# Patient Record
Sex: FEMALE | Race: WHITE | NOT HISPANIC OR LATINO | Employment: UNEMPLOYED | URBAN - METROPOLITAN AREA
[De-identification: names, ages, dates, MRNs, and addresses within clinical notes are randomized per-mention and may not be internally consistent; named-entity substitution may affect disease eponyms.]

---

## 2024-10-19 ENCOUNTER — HOSPITAL ENCOUNTER (EMERGENCY)
Facility: HOSPITAL | Age: 29
Discharge: HOME | End: 2024-10-19
Attending: STUDENT IN AN ORGANIZED HEALTH CARE EDUCATION/TRAINING PROGRAM

## 2024-10-19 VITALS
SYSTOLIC BLOOD PRESSURE: 110 MMHG | OXYGEN SATURATION: 98 % | BODY MASS INDEX: 28.4 KG/M2 | DIASTOLIC BLOOD PRESSURE: 78 MMHG | HEIGHT: 68 IN | TEMPERATURE: 99 F | HEART RATE: 95 BPM | WEIGHT: 187.38 LBS | RESPIRATION RATE: 16 BRPM

## 2024-10-19 DIAGNOSIS — O21.0 HYPEREMESIS GRAVIDARUM: Primary | ICD-10-CM

## 2024-10-19 DIAGNOSIS — Z36.9 1ST TRIMESTER SCREENING: ICD-10-CM

## 2024-10-19 LAB
B-HCG UR QL: POSITIVE
BUN SERPL-MCNC: 7 MG/DL (ref 6–30)
CHLORIDE SERPL-SCNC: 103 MMOL/L (ref 95–110)
CREAT SERPL-MCNC: 0.6 MG/DL (ref 0.5–1.4)
CTP QC/QA: YES
GLUCOSE SERPL-MCNC: 87 MG/DL (ref 70–110)
HCG INTACT+B SERPL-ACNC: NORMAL MIU/ML
HCT VFR BLD CALC: 42 %PCV (ref 36–54)
POC IONIZED CALCIUM: 1.21 MMOL/L (ref 1.06–1.42)
POC TCO2 (MEASURED): 21 MMOL/L (ref 23–29)
POTASSIUM BLD-SCNC: 3.8 MMOL/L (ref 3.5–5.1)
SAMPLE: ABNORMAL
SODIUM BLD-SCNC: 137 MMOL/L (ref 136–145)

## 2024-10-19 PROCEDURE — 99283 EMERGENCY DEPT VISIT LOW MDM: CPT

## 2024-10-19 PROCEDURE — 81025 URINE PREGNANCY TEST: CPT | Performed by: PHYSICIAN ASSISTANT

## 2024-10-19 PROCEDURE — 84702 CHORIONIC GONADOTROPIN TEST: CPT | Performed by: PHYSICIAN ASSISTANT

## 2024-10-19 RX ORDER — PYRIDOXINE HCL (VITAMIN B6) 25 MG
25 TABLET ORAL
Status: DISCONTINUED | OUTPATIENT
Start: 2024-10-19 | End: 2024-10-19 | Stop reason: HOSPADM

## 2024-10-19 RX ORDER — DOXYLAMINE SUCCINATE AND PYRIDOXINE HYDROCHLORIDE, DELAYED RELEASE TABLETS 10 MG/10 MG 10; 10 MG/1; MG/1
2 TABLET, DELAYED RELEASE ORAL NIGHTLY
Qty: 30 TABLET | Refills: 0 | Status: SHIPPED | OUTPATIENT
Start: 2024-10-19

## 2024-10-19 NOTE — ED NOTES
I-STAT Chem-8+ Results:   Value Reference Range   Sodium 137 136-145 mmol/L   Potassium  3.8 3.5-5.1 mmol/L   Chloride 103  mmol/L   Ionized Calcium 1.21 1.06-1.42 mmol/L   CO2 (measured) 21 23-29 mmol/L   Glucose 87  mg/dL   BUN 7 6-30 mg/dL   Creatinine 0.6 0.5-1.4 mg/dL   Hematocrit 42 36-54%

## 2024-10-19 NOTE — Clinical Note
"Penelope Pakdamien Velez was seen and treated in our emergency department on 10/19/2024.  She may return to work on 10/21/2024.       If you have any questions or concerns, please don't hesitate to call.      Brooklyn Reynolds PA-C"

## 2024-10-19 NOTE — DISCHARGE INSTRUCTIONS
You have an intrauterine pregnancy on bedside US. Establish care with OBGYN.   Please start over-the-counter prenatal vitamins.  If diclegis is too expensive, you can take over the counter Unisom and Vitamin B6 that has the same ingredients.   Take diclegis to help with nausea and vomiting.   Doxylamine 10 mg/pyridoxine 10 mg extended release (Diclegis): Oral: Initial: Two tablets at bedtime on days 1 and 2; if symptoms persist, take 1 tablet in morning and 2 tablets at bedtime on day 3; if symptoms persist, may further increase to 1 tablet in morning, 1 tablet mid-afternoon, and 2 tablets at bedtime on day 4 (maximum: doxylamine 40 mg/pyridoxine 40 mg [4 tablets] per day).   Follow up with OBGYN to establish care. Referral placed.  Return to the ER for new or worsening symptoms.

## 2024-10-19 NOTE — ED NOTES
"Penelope Velez, an 29 y.o. female presents to the ED via POV for complaints of vomiting 10x per day for the last 2 weeks. Denies blood in the vomit. Denies abdominal pain, CP, SOB, dizziness. Last episode of emesis was 1 hour PTA. States that her LKMP was >1 month ago. States she just returned back from [facility] and had "all the Sx that her cycle was going to start but it didn't".       LOC: The patient is awake, alert and aware of environment with an appropriate affect, the patient is oriented x 3 and speaking appropriately.   APPEARANCE: Patient appears comfortable and in no acute distress, patient is clean and well groomed.  SKIN: The skin is warm and dry, color consistent with ethnicity.   MUSCULOSKELETAL: Patient moving all extremities spontaneously, no swelling noted.  RESPIRATORY: Airway is open and patent, respirations are spontaneous, patient has a normal effort and rate, no accessory muscle use noted.  CARDIAC: Patient has a normal rate and regular rhythm, no edema noted, capillary refill < 3 seconds.   GASTRO: Soft and non tender to palpation, no distention noted. +vomiting  : Pt denies any pain or frequency with urination.  NEURO: Pt opens eyes spontaneously, behavior appropriate to situation, follows commands.        Chief Complaint   Patient presents with    Vomiting     Review of patient's allergies indicates:  No Known Allergies  No past medical history on file.    "

## 2024-10-21 NOTE — ED PROVIDER NOTES
Encounter Date: 10/19/2024       History     Chief Complaint   Patient presents with    Vomiting     Penelope Velez, an 29 y.o. female with no significant past medical history who presents to the ED with her  via POV for complaints of vomiting 10x per day for the last 2 weeks.     She reports symptoms all day. Denies any hematemesis, abd pain, CP, SOB, dizziness. Vomited 1 hour PTA. She states that she had 3 days of menstrual bleeding about 2 weeks ago.           Review of patient's allergies indicates:  No Known Allergies  History reviewed. No pertinent past medical history.  History reviewed. No pertinent surgical history.  No family history on file.  Social History     Tobacco Use    Smoking status: Never    Smokeless tobacco: Never   Substance Use Topics    Alcohol use: Not Currently    Drug use: Never     Review of Systems   Constitutional:  Negative for fever.   HENT:  Negative for sore throat.    Respiratory:  Negative for shortness of breath.    Cardiovascular:  Negative for chest pain.   Gastrointestinal:  Positive for nausea and vomiting. Negative for abdominal pain, blood in stool, constipation and diarrhea.   Genitourinary:  Negative for dysuria, frequency, hematuria, pelvic pain, vaginal bleeding and vaginal discharge.   Musculoskeletal:  Negative for back pain.   Skin:  Negative for rash.   Neurological:  Negative for weakness.   Hematological:  Does not bruise/bleed easily.       Physical Exam     Initial Vitals [10/19/24 1026]   BP Pulse Resp Temp SpO2   (!) 135/97 104 18 98.4 °F (36.9 °C) 98 %      MAP       --         Physical Exam    Vitals reviewed.  Constitutional: She appears well-developed and well-nourished. She is not diaphoretic. She is cooperative.  Non-toxic appearance. She does not have a sickly appearance. She does not appear ill. No distress.   Emesis bag in hand.   HENT:   Head: Normocephalic and atraumatic.   Nose: Nose normal. Mouth/Throat: No trismus in the jaw.   Eyes:  Conjunctivae and EOM are normal.   Neck:   Normal range of motion.  Pulmonary/Chest: No accessory muscle usage. No tachypnea. No respiratory distress.   Abdominal: Abdomen is soft. She exhibits no distension. There is no abdominal tenderness. There is no rebound and no guarding.   Musculoskeletal:         General: Normal range of motion.      Cervical back: Normal range of motion.     Neurological: She is alert. She has normal strength.   Skin: Skin is warm and dry. No erythema. No pallor.         ED Course   Procedures  Labs Reviewed   POCT URINE PREGNANCY - Abnormal       Result Value    POC Preg Test, Ur Positive (*)      Acceptable Yes     ISTAT PROCEDURE - Abnormal    POC Glucose 87      POC BUN 7      POC Creatinine 0.6      POC Sodium 137      POC Potassium 3.8      POC Chloride 103      POC TCO2 (MEASURED) 21 (*)     POC Ionized Calcium 1.21      POC Hematocrit 42      Sample MEMO     HCG, QUANTITATIVE    HCG Quant [id number]      Narrative:     Release to patient->Immediate          Imaging Results    None          Medications - No data to display  Medical Decision Making  Patient is pregnant which likely explains her symptoms for 10 days. She did have vaginal bleeding 2 weeks ago that resolved. No abdominal pain. No active vaginal bleeding or discharge. Attendign performed bedside US that confirms IUD. We can check istatchem8 and hcg and reassess.    Amount and/or Complexity of Data Reviewed  Labs: ordered. Decision-making details documented in ED Course.    Risk  Prescription drug management.               ED Course as of 10/20/24 2009   Sat Oct 19, 2024   1052 BP(!): 135/97 [CL]   1052 Temp: 98.4 °F (36.9 °C) [CL]   1052 Pulse: 104 [CL]   1052 Resp: 18 [CL]   1052 SpO2: 98 % [CL]   1052 hCG Qualitative, Urine(!): Positive [CL]      ED Course User Index  [CL] Brooklyn Reynolds PA-C          Istatchem wnl.   She has been tolerating water while here.   Rx diclegis. Made aware of OTC options. Consider  sunil candy, sodas, and distraction techniques. Start prenatal vitamins.   Return to ER precautions given. Referral to OB.            I have reviewed patient's chart and discussed this case with my supervising MD.     Brooklyn Reynolds PA-C  Emergent Department  [facility]  Spectralink #[id number] or #[id number]        Clinical Impression:  Final diagnoses:  [O21.0] Hyperemesis gravidarum (Primary)  [Z36.9] 1St trimester screening          ED Disposition Condition    Discharge Stable          ED Prescriptions       Medication Sig Dispense Start Date End Date Auth. Provider    doxylamine-pyridoxine, vit B6, (DICLEGIS) 10-10 mg TbEC Take 2 tablets by mouth every evening. 30 tablet 10/19/2024 -- Brooklyn Reynolds PA-C          Follow-up Information       Follow up With Specialties Details Why Contact Info Additional Information    [facility] Schedule an appointment as soon as possible for a visit   [address] [address] [phone] Turn at Entrance 1 on [facility] in [facility] and take elevators to Floor 2. Follow signs to [facility]. Take Center Elevators to Floor 4 for [address].    [facility] Emergency Dept Emergency Medicine  If symptoms worsen [address]  [facility] [address] [phone]                Brooklyn Reynolds PA-C  10/20/24 2009

## 2024-11-19 ENCOUNTER — HOSPITAL ENCOUNTER (EMERGENCY)
Facility: OTHER | Age: 29
Discharge: HOME | End: 2024-11-19
Attending: EMERGENCY MEDICINE
Payer: OTHER GOVERNMENT

## 2024-11-19 VITALS
OXYGEN SATURATION: 97 % | TEMPERATURE: 98 F | HEIGHT: 67 IN | DIASTOLIC BLOOD PRESSURE: 76 MMHG | SYSTOLIC BLOOD PRESSURE: 116 MMHG | WEIGHT: 196.19 LBS | HEART RATE: 82 BPM | RESPIRATION RATE: 18 BRPM | BODY MASS INDEX: 30.79 KG/M2

## 2024-11-19 DIAGNOSIS — R10.11 RUQ PAIN: ICD-10-CM

## 2024-11-19 DIAGNOSIS — R11.2 NAUSEA AND VOMITING, UNSPECIFIED VOMITING TYPE: Primary | ICD-10-CM

## 2024-11-19 LAB
ALBUMIN SERPL BCP-MCNC: 3.7 G/DL (ref 3.5–5.2)
ALP SERPL-CCNC: 53 U/L (ref 40–150)
ALT SERPL W/O P-5'-P-CCNC: 10 U/L (ref 10–44)
ANION GAP SERPL CALC-SCNC: 9 MMOL/L (ref 8–16)
AST SERPL-CCNC: 13 U/L (ref 10–40)
BACTERIA #/AREA URNS HPF: ABNORMAL /HPF
BASOPHILS # BLD AUTO: 0.02 K/UL (ref 0–0.2)
BASOPHILS NFR BLD: 0.2 % (ref 0–1.9)
BILIRUB SERPL-MCNC: 0.2 MG/DL (ref 0.1–1)
BILIRUB UR QL STRIP: NEGATIVE
BUN SERPL-MCNC: 5 MG/DL (ref 6–20)
CALCIUM SERPL-MCNC: 9.7 MG/DL (ref 8.7–10.5)
CHLORIDE SERPL-SCNC: 106 MMOL/L (ref 95–110)
CLARITY UR: ABNORMAL
CO2 SERPL-SCNC: 22 MMOL/L (ref 23–29)
COLOR UR: YELLOW
CREAT SERPL-MCNC: 0.7 MG/DL (ref 0.5–1.4)
DIFFERENTIAL METHOD BLD: NORMAL
EOSINOPHIL # BLD AUTO: 0.1 K/UL (ref 0–0.5)
EOSINOPHIL NFR BLD: 0.9 % (ref 0–8)
ERYTHROCYTE [DISTWIDTH] IN BLOOD BY AUTOMATED COUNT: 12.3 % (ref 11.5–14.5)
EST. GFR  (NO RACE VARIABLE): >60 ML/MIN/1.73 M^2
GLUCOSE SERPL-MCNC: 94 MG/DL (ref 70–110)
GLUCOSE UR QL STRIP: NEGATIVE
HCG INTACT+B SERPL-ACNC: NORMAL MIU/ML
HCT VFR BLD AUTO: 41.6 % (ref 37–48.5)
HGB BLD-MCNC: 14.2 G/DL (ref 12–16)
HGB UR QL STRIP: NEGATIVE
IMM GRANULOCYTES # BLD AUTO: 0.03 K/UL (ref 0–0.04)
IMM GRANULOCYTES NFR BLD AUTO: 0.3 % (ref 0–0.5)
KETONES UR QL STRIP: NEGATIVE
LEUKOCYTE ESTERASE UR QL STRIP: ABNORMAL
LIPASE SERPL-CCNC: 35 U/L (ref 4–60)
LYMPHOCYTES # BLD AUTO: 1.8 K/UL (ref 1–4.8)
LYMPHOCYTES NFR BLD: 19.9 % (ref 18–48)
MCH RBC QN AUTO: 29.8 PG (ref 27–31)
MCHC RBC AUTO-ENTMCNC: 34.1 G/DL (ref 32–36)
MCV RBC AUTO: 87 FL (ref 82–98)
MICROSCOPIC COMMENT: ABNORMAL
MONOCYTES # BLD AUTO: 0.5 K/UL (ref 0.3–1)
MONOCYTES NFR BLD: 6 % (ref 4–15)
NEUTROPHILS # BLD AUTO: 6.5 K/UL (ref 1.8–7.7)
NEUTROPHILS NFR BLD: 72.7 % (ref 38–73)
NITRITE UR QL STRIP: NEGATIVE
NRBC BLD-RTO: 0 /100 WBC
PH UR STRIP: 6 [PH] (ref 5–8)
PLATELET # BLD AUTO: 209 K/UL (ref 150–450)
PMV BLD AUTO: 10.5 FL (ref 9.2–12.9)
POTASSIUM SERPL-SCNC: 3.8 MMOL/L (ref 3.5–5.1)
PROT SERPL-MCNC: 7.3 G/DL (ref 6–8.4)
PROT UR QL STRIP: NEGATIVE
RBC # BLD AUTO: 4.76 M/UL (ref 4–5.4)
RBC #/AREA URNS HPF: 3 /HPF (ref 0–4)
SODIUM SERPL-SCNC: 137 MMOL/L (ref 136–145)
SP GR UR STRIP: 1.01 (ref 1–1.03)
SQUAMOUS #/AREA URNS HPF: 5 /HPF
URN SPEC COLLECT METH UR: ABNORMAL
UROBILINOGEN UR STRIP-ACNC: NEGATIVE EU/DL
WBC # BLD AUTO: 8.88 K/UL (ref 3.9–12.7)
WBC #/AREA URNS HPF: 14 /HPF (ref 0–5)

## 2024-11-19 PROCEDURE — 99285 EMERGENCY DEPT VISIT HI MDM: CPT | Mod: 25

## 2024-11-19 PROCEDURE — 80053 COMPREHEN METABOLIC PANEL: CPT | Performed by: PHYSICIAN ASSISTANT

## 2024-11-19 PROCEDURE — 84702 CHORIONIC GONADOTROPIN TEST: CPT | Performed by: PHYSICIAN ASSISTANT

## 2024-11-19 PROCEDURE — 81000 URINALYSIS NONAUTO W/SCOPE: CPT | Performed by: PHYSICIAN ASSISTANT

## 2024-11-19 PROCEDURE — 96374 THER/PROPH/DIAG INJ IV PUSH: CPT

## 2024-11-19 PROCEDURE — 83690 ASSAY OF LIPASE: CPT | Performed by: PHYSICIAN ASSISTANT

## 2024-11-19 PROCEDURE — 85025 COMPLETE CBC W/AUTO DIFF WBC: CPT | Performed by: PHYSICIAN ASSISTANT

## 2024-11-19 PROCEDURE — 63600175 PHARM REV CODE 636 W HCPCS: Performed by: PHYSICIAN ASSISTANT

## 2024-11-19 PROCEDURE — 87086 URINE CULTURE/COLONY COUNT: CPT | Performed by: PHYSICIAN ASSISTANT

## 2024-11-19 RX ORDER — ONDANSETRON HYDROCHLORIDE 2 MG/ML
4 INJECTION, SOLUTION INTRAVENOUS
Status: COMPLETED | OUTPATIENT
Start: 2024-11-19 | End: 2024-11-19

## 2024-11-19 RX ORDER — ONDANSETRON 4 MG/1
4 TABLET, FILM COATED ORAL EVERY 6 HOURS
Qty: 30 TABLET | Refills: 1 | Status: SHIPPED | OUTPATIENT
Start: 2024-11-19

## 2024-11-19 RX ORDER — NITROFURANTOIN 25; 75 MG/1; MG/1
100 CAPSULE ORAL 2 TIMES DAILY
Qty: 10 CAPSULE | Refills: 0 | Status: SHIPPED | OUTPATIENT
Start: 2024-11-19 | End: 2024-11-24

## 2024-11-19 RX ADMIN — ONDANSETRON 4 MG: 2 INJECTION INTRAMUSCULAR; INTRAVENOUS at 12:11

## 2024-11-19 NOTE — ED PROVIDER NOTES
Encounter Date: 2024       History     Chief Complaint   Patient presents with    Emesis     Pt reports intermittent vomiting and abdominal cramping x 1 month. Pt states she is ~11 wks pregnant     Patient was a 29-year-old female with no past medical history,  that presents to emergency department with nausea and vomiting.  Patient recently diagnosed at outside hospital after she presented with abdominal pain and nausea and vomiting.  Patient had positive pregnancy test and bedside ultrasound with intrauterine pregnancy performed by ER physician.  There was no radiology performed ultrasound at that time.  Patient states that since discharge she has been taking B6 and Unisom without any improvement in her nausea and vomiting.  Patient unable to tolerate any oral intake besides small amount of liquids and crackers.  She has been able to tolerate her vitamins every morning.  Denies any vaginal discharge or pelvic pain.     The history is provided by the patient.     Review of patient's allergies indicates:  No Known Allergies  History reviewed. No pertinent past medical history.  History reviewed. No pertinent surgical history.  No family history on file.  Social History     Tobacco Use    Smoking status: Never    Smokeless tobacco: Never   Substance Use Topics    Alcohol use: Not Currently    Drug use: Never     Review of Systems  ROS negative except as noted in HPI    Physical Exam     Initial Vitals   BP Pulse Resp Temp SpO2   24 1117 24 1117 24 1117 24 1347 24 1117   (!) 141/87 93 20 98 °F (36.7 °C) 99 %      MAP       --                Physical Exam    Nursing note and vitals reviewed.  Constitutional: She appears well-developed and well-nourished. She is not diaphoretic. She appears distressed.   HENT:   Head: Normocephalic and atraumatic.   Right Ear: External ear normal.   Left Ear: External ear normal.   Nose: Nose normal. Mouth/Throat: Oropharynx is clear and moist.    Cardiovascular:  Normal rate, regular rhythm, normal heart sounds and intact distal pulses.           Pulmonary/Chest: Breath sounds normal. No respiratory distress. She has no wheezes. She has no rales.   Abdominal: Abdomen is soft. She exhibits no distension. There is abdominal tenderness (Right upper quadrant tenderness to palpation). There is no rebound.   Musculoskeletal:         General: No edema.     Neurological: She is alert and oriented to person, place, and time. She has normal strength.   Skin: Skin is warm. Capillary refill takes less than 2 seconds.   Psychiatric: She has a normal mood and affect.         ED Course   Procedures  Labs Reviewed   COMPREHENSIVE METABOLIC PANEL - Abnormal       Result Value    Sodium 137      Potassium 3.8      Chloride 106      CO2 22 (*)     Glucose 94      BUN 5 (*)     Creatinine 0.7      Calcium 9.7      Total Protein 7.3      Albumin 3.7      Total Bilirubin 0.2      Alkaline Phosphatase 53      AST 13      ALT 10      eGFR >60      Anion Gap 9      Narrative:     Release to patient->Immediate   URINALYSIS, REFLEX TO URINE CULTURE - Abnormal    Specimen UA Urine, Clean Catch      Color, UA Yellow      Appearance, UA Hazy (*)     pH, UA 6.0      Specific Gravity, UA 1.010      Protein, UA Negative      Glucose, UA Negative      Ketones, UA Negative      Bilirubin (UA) Negative      Occult Blood UA Negative      Nitrite, UA Negative      Urobilinogen, UA Negative      Leukocytes, UA 2+ (*)     Narrative:     Specimen Source->Urine   URINALYSIS MICROSCOPIC - Abnormal    RBC, UA 3      WBC, UA 14 (*)     Bacteria Many (*)     Squam Epithel, UA 5      Microscopic Comment SEE COMMENT      Narrative:     Specimen Source->Urine   CULTURE, URINE   CBC W/ AUTO DIFFERENTIAL    WBC 8.88      RBC 4.76      Hemoglobin 14.2      Hematocrit 41.6      MCV 87      MCH 29.8      MCHC 34.1      RDW 12.3      Platelets 209      MPV 10.5      Immature Granulocytes 0.3      Gran # (ANC)  6.5      Immature Grans (Abs) 0.03      Lymph # 1.8      Mono # 0.5      Eos # 0.1      Baso # 0.02      nRBC 0      Gran % 72.7      Lymph % 19.9      Mono % 6.0      Eosinophil % 0.9      Basophil % 0.2      Differential Method Automated      Narrative:     Release to patient->Immediate   HCG, QUANTITATIVE    HCG Quant [id number]      Narrative:     Release to patient->Immediate   LIPASE   LIPASE    Lipase 35      Narrative:     Release to patient->Immediate          Imaging Results              US OB <14 Wks, TransAbd, Single Gestation (Final result)  Result time 11/19/24 13:25:43      Final result by Juanjo Gonzalez III, MD (11/19/24 13:25:43)                   Impression:      Pregnancy normal 11 weeks 5 days.  Delivery date is 06/05/2025.      Electronically signed by: Juanjo Gonzalez MD  Date:    11/19/2024  Time:    13:25               Narrative:    EXAMINATION:  US OB <14 WEEKS TRANSABDOM, SINGLE GESTATION    CLINICAL HISTORY:  Other specified pregnancy related conditions, unspecified trimester    FINDINGS:  Uterus measures 13.2 x 6.3 x 10 cm.    Right ovary measures 3.3 x 2.6 x 1.3 cm.    Left ovary measures 2.8 x 1.6 x 1 6 cm.    There is a right corpus luteum cyst of pregnancy measuring 1.4 cm.  There is a crown-rump length corresponding to 11 weeks 5 days.  Heart rate is 167 beats per minute.  There is a yolk sac.  Delivery date is 06/05/2025.                                       US Abdomen Limited (Final result)  Result time 11/19/24 13:24:32   Procedure changed from US Abdomen Complete     Final result by Juanjo Gonzalez III, MD (11/19/24 13:24:32)                   Impression:      No acute process seen.      Electronically signed by: Juanjo Gonzalez MD  Date:    11/19/2024  Time:    13:24               Narrative:    EXAMINATION:  US ABDOMEN LIMITED    CLINICAL HISTORY:  RUQ pain;  Right upper quadrant pain    FINDINGS:  The pancreas demonstrates no abnormality.  The gallbladder demonstrates no stone,  wall thickening, or Pearl sign and the bile duct measures 2 mm.  No intrahepatic bile duct dilatation seen.  There is no hypervascularity.  The bile duct is 3 mm.                                       Medications   ondansetron injection 4 mg (4 mg Intravenous Given 24 1220)     Medical Decision Making  Patient was a 29-year-old female with no past medical history,  that presents to emergency department with nausea and vomiting.     On initial evaluation patient was in mild distress secondary to vomiting.  She was vomited multiple times and triage.  Vitals are within normal limits and patient was cooperative with the history and physical examination.    Differential for patient's presentation includes hyperemesis gravidarum, cholelithiasis, UTI, pyelonephritis.  Patient did have right upper quadrant tenderness.  Patient was not been trialed on any other medications except for B6 and Unisom.  We will provide adjunctive supportive treatment in the ER to improve symptoms.  Workup initiated evaluate for reversible causes of abdominal tenderness and nausea and vomiting.    Symptoms resolved after supportive treatment in the ER.  Patient has asymptomatic bacteria in pregnancy.  Additionally we will provide patient with Macrobid prescription and prescription for Zofran to take at home.  She expressed understanding had no other concerns or questions at this time.  ER return precautions given.    Amount and/or Complexity of Data Reviewed  Labs:  Decision-making details documented in ED Course.  Radiology: ordered.    Risk  Prescription drug management.               ED Course as of 24 1550   Tue 2024   1242 Bacteria, UA(!): Many [TK]   1242 WBC, UA(!): 14 [TK]   1242 Leukocyte Esterase, UA(!): 2+ [TK]   1248 Lipase: 35 [TK]   1248 Sodium: 137 [TK]   1248 Potassium: 3.8 [TK]   1248 Creatinine: 0.7 [TK]   1248 BUN(!): 5 [TK]   1248 BILIRUBIN TOTAL: 0.2 [TK]   1248 ALP: 53 [TK]   1248 AST: 13 [TK]   1248  ALT: 10 [TK]   1334 Beta HCG Quant: [id number]  Increased compared to previous level   [TK]      ED Course User Index  [TK] Veda Keenan DO                             Clinical Impression:  Final diagnoses:  [R10.11] RUQ pain - Positive anthony  [R11.2] Nausea and vomiting, unspecified vomiting type (Primary)          ED Disposition Condition    Discharge Stable          ED Prescriptions       Medication Sig Dispense Start Date End Date Auth. Provider    ondansetron (ZOFRAN) 4 MG tablet Take 1 tablet (4 mg total) by mouth every 6 (six) hours. 30 tablet 11/19/2024 -- Veda Keenan DO    nitrofurantoin, macrocrystal-monohydrate, (MACROBID) 100 MG capsule Take 1 capsule (100 mg total) by mouth 2 (two) times daily. for 5 days 10 capsule 11/19/2024 11/24/2024 Veda Keenan DO          Follow-up Information       Follow up With Specialties Details Why Contact Info    [facility] - Emergency Dept Emergency Medicine  If symptoms worsen [address]  [facility] [id number] [phone]             Veda Keenan DO  Resident  11/19/24 3557

## 2024-11-19 NOTE — ED TRIAGE NOTES
Pt c/o hyperemesis gravidarum throughout pregnancy. Also c/o 2 days abdominal cramping without bleeding. States she is approx 11w pregnant.

## 2024-11-19 NOTE — FIRST PROVIDER EVALUATION
Emergency Department TeleTriage Encounter Note      CHIEF COMPLAINT    Chief Complaint   Patient presents with    Emesis     Pt reports intermittent vomiting and abdominal cramping x 1 month. Pt states she is ~11 wks pregnant       VITAL SIGNS   Initial Vitals [11/19/24 1117]   BP Pulse Resp Temp SpO2   (!) 141/87 93 20 -- 99 %      MAP       --            ALLERGIES    Review of patient's allergies indicates:  No Known Allergies    PROVIDER TRIAGE NOTE  Patient is 11 weeks pregnant presenting with persistent abdominal pain and nausea. She has not been able to be seen by OB yet because of insurance problems. No vaginal bleeding. OTC nausea meds not helping.       ORDERS  Labs Reviewed - No data to display    ED Orders (720h ago, onward)      None              Virtual Visit Note: The provider triage portion of this emergency department evaluation and documentation was performed via [employer], a HIPAA-compliant telemedicine application, in concert with a tele-presenter in the room. A face to face patient evaluation with one of my colleagues will occur once the patient is placed in an emergency department room.      DISCLAIMER: This note was prepared with [employer] voice recognition transcription software. Garbled syntax, mangled pronouns, and other bizarre constructions may be attributed to that software system.

## 2024-11-19 NOTE — DISCHARGE INSTRUCTIONS
We evaluated you for causes of nausea and vomiting. Suspect that this is related to your current pregnancy. In your work up we saw evidence of Urinary track infection.     Please fill Nitrofurantoin prescription and take till all pill are gone.     Additionally We placed prescription for Zofran, please take for nausea as needed.

## 2024-11-20 LAB — BACTERIA UR CULT: NO GROWTH

## 2024-11-29 ENCOUNTER — CLINICAL SUPPORT (OUTPATIENT)
Dept: OBSTETRICS AND GYNECOLOGY | Facility: CLINIC | Age: 29
End: 2024-11-29

## 2024-11-29 ENCOUNTER — PATIENT MESSAGE (OUTPATIENT)
Dept: OBSTETRICS AND GYNECOLOGY | Facility: CLINIC | Age: 29
End: 2024-11-29
Payer: OTHER GOVERNMENT

## 2024-11-29 DIAGNOSIS — N91.2 AMENORRHEA: Primary | ICD-10-CM

## 2024-11-29 PROCEDURE — 99999 PR PBB SHADOW E&M-EST. PATIENT-LVL II: CPT | Mod: PBBFAC,,,

## 2024-11-29 PROCEDURE — 99212 OFFICE O/P EST SF 10 MIN: CPT | Mod: PBBFAC

## 2024-12-10 ENCOUNTER — HOSPITAL ENCOUNTER (OUTPATIENT)
Dept: PERINATAL CARE | Facility: OTHER | Age: 29
Discharge: HOME | End: 2024-12-10
Attending: STUDENT IN AN ORGANIZED HEALTH CARE EDUCATION/TRAINING PROGRAM
Payer: OTHER GOVERNMENT

## 2024-12-10 ENCOUNTER — OFFICE VISIT (OUTPATIENT)
Dept: OBSTETRICS AND GYNECOLOGY | Facility: CLINIC | Age: 29
End: 2024-12-10
Payer: OTHER GOVERNMENT

## 2024-12-10 VITALS — BODY MASS INDEX: 31.63 KG/M2 | WEIGHT: 201.5 LBS | HEIGHT: 67 IN

## 2024-12-10 DIAGNOSIS — Z34.90 PREGNANCY, UNSPECIFIED GESTATIONAL AGE: Primary | ICD-10-CM

## 2024-12-10 DIAGNOSIS — N91.2 AMENORRHEA: ICD-10-CM

## 2024-12-10 DIAGNOSIS — O21.0 HYPEREMESIS GRAVIDARUM: ICD-10-CM

## 2024-12-10 PROCEDURE — 99213 OFFICE O/P EST LOW 20 MIN: CPT | Mod: PBBFAC,25 | Performed by: ADVANCED PRACTICE MIDWIFE

## 2024-12-10 PROCEDURE — 76815 OB US LIMITED FETUS(S): CPT

## 2024-12-10 PROCEDURE — 87086 URINE CULTURE/COLONY COUNT: CPT | Performed by: ADVANCED PRACTICE MIDWIFE

## 2024-12-10 PROCEDURE — 99999 PR PBB SHADOW E&M-EST. PATIENT-LVL III: CPT | Mod: PBBFAC,,, | Performed by: ADVANCED PRACTICE MIDWIFE

## 2024-12-10 PROCEDURE — 87491 CHLMYD TRACH DNA AMP PROBE: CPT | Performed by: ADVANCED PRACTICE MIDWIFE

## 2024-12-10 PROCEDURE — 76816 OB US FOLLOW-UP PER FETUS: CPT | Mod: 26,,, | Performed by: OBSTETRICS & GYNECOLOGY

## 2024-12-10 RX ORDER — ONDANSETRON 8 MG/1
8 TABLET, ORALLY DISINTEGRATING ORAL EVERY 6 HOURS PRN
Qty: 30 TABLET | Refills: 1 | Status: SHIPPED | OUTPATIENT
Start: 2024-12-10

## 2024-12-10 NOTE — PROGRESS NOTES
HISTORY OF PRESENT ILLNESS:    Penelope Velez is a 29 y.o. female, ,  No LMP recorded. Patient is pregnant.  for a routine exam complaining of amenorrhea.    Pt reports LMP of 24, had dating scan today- report pending.   Pt reports nausea.  Past Medical History:   Diagnosis Date    Chicken pox 7    Hepatitis B Baby    Was a baby. It was treated       History reviewed. No pertinent surgical history.    MEDICATIONS AND ALLERGIES:      Current Outpatient Medications:     doxylamine-pyridoxine, vit B6, (DICLEGIS) 10-10 mg TbEC, Take 2 tablets by mouth every evening., Disp: 30 tablet, Rfl: 0    ondansetron (ZOFRAN) 4 MG tablet, Take 1 tablet (4 mg total) by mouth every 6 (six) hours., Disp: 30 tablet, Rfl: 1    ondansetron (ZOFRAN-ODT) 8 MG TbDL, Take 1 tablet (8 mg total) by mouth every 6 (six) hours as needed (anusea)., Disp: 30 tablet, Rfl: 1    Review of patient's allergies indicates:  No Known Allergies    Family History   Problem Relation Name Age of Onset    Breast cancer Neg Hx      Uterine cancer Neg Hx      Cervical cancer Neg Hx         Social History     Socioeconomic History    Marital status:    Tobacco Use    Smoking status: Never    Smokeless tobacco: Never   Substance and Sexual Activity    Alcohol use: Never    Drug use: Never    Sexual activity: Yes     Partners: Male     Birth control/protection: None       COMPREHENSIVE GYN HISTORY:  PAP History: Denies abnormal Paps.  Infection History: Denies STDs. Denies PID. Hx hep B as a child  Benign History: Denies uterine fibroids. Denies ovarian cysts. Denies endometriosis. Denies other conditions.  Cancer History: Denies cervical cancer. Denies uterine cancer or hyperplasia. Denies ovarian cancer. Denies vulvar cancer or pre-cancer. Denies vaginal cancer or pre-cancer. Denies breast cancer. Denies colon cancer.  Sexual Activity History: Reports currently being sexually active  Menstrual History: None.  Contraception: None    ROS:  GENERAL: No  "weight changes. No swelling. No fatigue. No fever.  CARDIOVASCULAR: No chest pain. No shortness of breath. No leg cramps.   NEUROLOGICAL: No headaches. No vision changes.  BREASTS: No pain. No lumps. No discharge.  ABDOMEN: No pain. No nausea. No vomiting. No diarrhea. No constipation.  REPRODUCTIVE: No abnormal bleeding.   VULVA: No pain. No lesions. No itching.  VAGINA: No relaxation. No itching. No odor. No discharge. No lesions.  URINARY: No incontinence. No nocturia. No frequency. No dysuria.    Ht 5' 7" (1.702 m)   Wt 91.4 kg (201 lb 8 oz)   BMI 31.56 kg/m²     PE:  AFFECT: Alert and oriented X 3. Interactive during exam  GENERAL: Appearance well-nourished, well-developed, in no acute distress.  HEENT: WNL  TEETH: Good dentition.  THYROID: No thyromegally   LUNGS: Easy and unlabored  HEART: Regular rate and rhythm   ABDOMEN: Soft and nontender without masses or organomegally.  EXTREMITIES: No cyanosis, clubbing or edema.   SKIN: No lesions or rashes.  VULVA: No lesions, masses or tenderness.  VAGINA: Moist and well rugated without lesions or discharge.  CERVIX: Moist and pink without lesions, discharge or tenderness.    Spotting with Pap  UTERUS SIZE: nontender and without masses.  ADNEXA: No masses or tenderness.  RECTUM: Deferred.  ESTIMATE OF PELVIC CAPACITY: Adequate    PROCEDURES:  UPT Positive  Genprobe  Pap    DIAGNOSIS:  Gyn exam  IUP with stated LMP of 09/29/24, CHELSEA pending per dating scan report    PLAN:Routine prenatal care    MEDICATIONS PRESCRIBED:    LABS AND TESTS ORDERED:  New Ob Labs      NEW PREGNANCY COUNSELING  Patient was counseled today on:  - Routine prenatal blood tests including HIV and anticipated course of prenatal care  - Prenatal vitamins and folic acid  - Weight gain, nutrition, and exercise  - Seafood and mercury  - Properly heating deli and prepared meats and avoiding unrefrigerated deli  meats, cheeses, and milk products,   - Avoiding cat litter and raw meats due to risk of " Toxoplasmosis precautions   - Accuracy of the LMP-based CHELSEA and the value of an early TV-u/s  - Aneuploidy and neural tube screening -- cffDNA, sequential screening, and AFP screen at 15 weeks  - OTC medication in the first trimester  - Harmful effects of smoking, etOH, and recreational drugs  - [facility] u/s  at 18-20 weeks.  - Common complaints of pregnancy  - Seat belt use  - Childbirth classes and hospital facilities  - All questions were answered    TERATOLOGY COUNSELING:   Discussed indications and options for aneuploidy screening - pamphlets given    - Pain and bleeding precautions given    - Return to clinic in 4 weeks    Hayley Robledo CNM    OB/GYN    Total time of 30 minutes, including face-to-face time and non-face-to-face time preparing to see the patient (eg, review of tests), obtaining and/or reviewing separately obtained history, documenting clinical information in the electronic or other health record, independently interpreting results, communicating results to the patient/family/caregiver, or care coordination.

## 2024-12-12 LAB — BACTERIA UR CULT: NORMAL

## 2024-12-14 LAB
C TRACH DNA SPEC QL NAA+PROBE: NORMAL
N GONORRHOEA DNA SPEC QL NAA+PROBE: NORMAL

## 2025-01-06 ENCOUNTER — TELEPHONE (OUTPATIENT)
Dept: OBSTETRICS AND GYNECOLOGY | Facility: CLINIC | Age: 30
End: 2025-01-06

## 2025-01-06 DIAGNOSIS — Z36.89 ENCOUNTER FOR FETAL ANATOMIC SURVEY: Primary | ICD-10-CM

## 2025-01-06 NOTE — TELEPHONE ENCOUNTER
Spoke to pt's . They desire to have the materna 21 as it has been approved by their insurance company. Advised can have drawn at next visit. Pt's  agreed with plan

## 2025-01-15 ENCOUNTER — PROCEDURE VISIT (OUTPATIENT)
Dept: MATERNAL FETAL MEDICINE | Facility: CLINIC | Age: 30
End: 2025-01-15
Payer: OTHER GOVERNMENT

## 2025-01-15 DIAGNOSIS — Z36.2 ENCOUNTER FOR FOLLOW-UP ULTRASOUND OF FETAL ANATOMY: Primary | ICD-10-CM

## 2025-01-15 DIAGNOSIS — Z36.89 ENCOUNTER FOR FETAL ANATOMIC SURVEY: ICD-10-CM

## 2025-01-15 PROCEDURE — 76805 OB US >/= 14 WKS SNGL FETUS: CPT | Mod: PBBFAC | Performed by: OBSTETRICS & GYNECOLOGY

## 2025-01-17 ENCOUNTER — INITIAL PRENATAL (OUTPATIENT)
Dept: OBSTETRICS AND GYNECOLOGY | Facility: CLINIC | Age: 30
End: 2025-01-17
Payer: OTHER GOVERNMENT

## 2025-01-17 ENCOUNTER — LAB VISIT (OUTPATIENT)
Dept: LAB | Facility: OTHER | Age: 30
End: 2025-01-17
Attending: OBSTETRICS & GYNECOLOGY
Payer: OTHER GOVERNMENT

## 2025-01-17 VITALS
SYSTOLIC BLOOD PRESSURE: 123 MMHG | WEIGHT: 204.38 LBS | DIASTOLIC BLOOD PRESSURE: 89 MMHG | BODY MASS INDEX: 32.01 KG/M2

## 2025-01-17 DIAGNOSIS — Z34.00 SUPERVISION OF NORMAL FIRST PREGNANCY, ANTEPARTUM: ICD-10-CM

## 2025-01-17 DIAGNOSIS — Z34.00 SUPERVISION OF NORMAL FIRST PREGNANCY, ANTEPARTUM: Primary | ICD-10-CM

## 2025-01-17 PROCEDURE — 99212 OFFICE O/P EST SF 10 MIN: CPT | Mod: PBBFAC | Performed by: OBSTETRICS & GYNECOLOGY

## 2025-01-17 PROCEDURE — 99999 PR PBB SHADOW E&M-EST. PATIENT-LVL II: CPT | Mod: PBBFAC,,, | Performed by: OBSTETRICS & GYNECOLOGY

## 2025-01-17 PROCEDURE — 36415 COLL VENOUS BLD VENIPUNCTURE: CPT | Performed by: OBSTETRICS & GYNECOLOGY

## 2025-01-17 PROCEDURE — 87086 URINE CULTURE/COLONY COUNT: CPT | Performed by: OBSTETRICS & GYNECOLOGY

## 2025-01-17 PROCEDURE — 0502F SUBSEQUENT PRENATAL CARE: CPT | Mod: S$PBB,,, | Performed by: OBSTETRICS & GYNECOLOGY

## 2025-01-17 RX ORDER — POLYETHYLENE GLYCOL 3350 17 G/17G
POWDER, FOR SOLUTION ORAL
Qty: 595 G | Refills: 3 | Status: SHIPPED | OUTPATIENT
Start: 2025-01-17

## 2025-01-17 RX ORDER — PRENATAL WITH FERROUS FUM AND FOLIC ACID 3080; 920; 120; 400; 22; 1.84; 3; 20; 10; 1; 12; 200; 27; 25; 2 [IU]/1; [IU]/1; MG/1; [IU]/1; MG/1; MG/1; MG/1; MG/1; MG/1; MG/1; UG/1; MG/1; MG/1; MG/1; MG/1
1 TABLET ORAL DAILY
Qty: 30 TABLET | Refills: 11 | Status: SHIPPED | OUTPATIENT
Start: 2025-01-17 | End: 2026-01-17

## 2025-01-17 NOTE — PROGRESS NOTES
30yo  presents for inial OB visit at 20w1d.  Overall doing well today.  Has started feeling fetal flutter.  No vb/spotting.  She does struggle with constipation, no relief with suppository.  Also notices she pees on herself a lot, wakes up soaked sometimes.  Also sometimes incomplete empything.  Denies dysuria.  No vb/spotting.  No ctx/pain.  No other complaints today.  Taking PNV.      V-scan: Normal fluid noted around baby, cardiac activity and movement noted.     A/P:   Initial OB visit today.   Labs reviewed.  Mat 21 orders placed.   Anatomy US scheduled .   Discussed incontinence with patient.  Very reassuring fluid level around baby, do not suspect PPROM.  Recommend frequent bladder emptying.  Urine cx pending.   Recommend miralax prn constipation.  Encourage PO hydration.   Anatomy US scheduled .   OB ED precautions given.   Counseling done, precautions given, all questions answered.  RTC 4 wks for OB visit and gluocla.     Yesenia Orozco MD

## 2025-01-19 LAB
BACTERIA UR CULT: NORMAL
BACTERIA UR CULT: NORMAL

## 2025-01-23 LAB
ABOUT THE TEST: NORMAL
APPROVED BY: NORMAL
FETAL FRACTION: NORMAL
FETAL SEX RESULT: NORMAL
GESTATIONAL AGE > 9: YES
GESTATIONAL AGE: NORMAL
LAB DIRECTOR COMMENTS: NORMAL
LIMITATIONS:: NORMAL
MONOSOMY X RESULT: NOT DETECTED
NEGATIVE PREDICTIVE VALUE: NORMAL
NOTE: NORMAL
PERFORMANCE CHARACTERISTICS: NORMAL
PERFORMANCE: NORMAL
POSITIVE PREDICTIVE VALUE: NORMAL
RESULT: NEGATIVE
SERVICE CMNT 04-IMP: NORMAL
TEST METHODOLOGY:: NORMAL
TRISOMY 13 (T13): NEGATIVE
TRISOMY 18: NEGATIVE
TRISOMY 21 (T21): NEGATIVE
XXX (TRIPLE X SYNDROME): NOT DETECTED
XXY (KLINEFELTER SYNDROME): NOT DETECTED
XYY (JACOBS SYNDROME): NOT DETECTED

## 2025-01-28 DIAGNOSIS — O21.0 HYPEREMESIS GRAVIDARUM: ICD-10-CM

## 2025-01-28 RX ORDER — ONDANSETRON 8 MG/1
8 TABLET, ORALLY DISINTEGRATING ORAL EVERY 6 HOURS PRN
Qty: 30 TABLET | Refills: 1 | Status: SHIPPED | OUTPATIENT
Start: 2025-01-28

## 2025-02-12 ENCOUNTER — PROCEDURE VISIT (OUTPATIENT)
Dept: MATERNAL FETAL MEDICINE | Facility: CLINIC | Age: 30
End: 2025-02-12
Payer: OTHER GOVERNMENT

## 2025-02-12 DIAGNOSIS — Z36.2 ENCOUNTER FOR FOLLOW-UP ULTRASOUND OF FETAL ANATOMY: ICD-10-CM

## 2025-02-12 PROCEDURE — 76816 OB US FOLLOW-UP PER FETUS: CPT | Mod: PBBFAC | Performed by: OBSTETRICS & GYNECOLOGY

## 2025-02-13 ENCOUNTER — PATIENT MESSAGE (OUTPATIENT)
Dept: OTHER | Facility: OTHER | Age: 30
End: 2025-02-13
Payer: OTHER GOVERNMENT

## 2025-02-14 ENCOUNTER — PATIENT MESSAGE (OUTPATIENT)
Dept: OBSTETRICS AND GYNECOLOGY | Facility: CLINIC | Age: 30
End: 2025-02-14

## 2025-02-14 ENCOUNTER — LAB VISIT (OUTPATIENT)
Dept: LAB | Facility: OTHER | Age: 30
End: 2025-02-14
Attending: OBSTETRICS & GYNECOLOGY
Payer: OTHER GOVERNMENT

## 2025-02-14 ENCOUNTER — ROUTINE PRENATAL (OUTPATIENT)
Dept: OBSTETRICS AND GYNECOLOGY | Facility: CLINIC | Age: 30
End: 2025-02-14
Payer: OTHER GOVERNMENT

## 2025-02-14 VITALS
SYSTOLIC BLOOD PRESSURE: 117 MMHG | WEIGHT: 214.31 LBS | BODY MASS INDEX: 33.56 KG/M2 | DIASTOLIC BLOOD PRESSURE: 80 MMHG

## 2025-02-14 DIAGNOSIS — M54.32 LEFT SCIATIC NERVE PAIN: ICD-10-CM

## 2025-02-14 DIAGNOSIS — Z34.00 SUPERVISION OF NORMAL FIRST PREGNANCY, ANTEPARTUM: Primary | ICD-10-CM

## 2025-02-14 DIAGNOSIS — Z34.00 SUPERVISION OF NORMAL FIRST PREGNANCY, ANTEPARTUM: ICD-10-CM

## 2025-02-14 DIAGNOSIS — M25.552 LEFT HIP PAIN: ICD-10-CM

## 2025-02-14 LAB — GLUCOSE SERPL-MCNC: 84 MG/DL (ref 70–140)

## 2025-02-14 PROCEDURE — 99999 PR PBB SHADOW E&M-EST. PATIENT-LVL III: CPT | Mod: PBBFAC,,, | Performed by: OBSTETRICS & GYNECOLOGY

## 2025-02-14 PROCEDURE — 36415 COLL VENOUS BLD VENIPUNCTURE: CPT | Performed by: OBSTETRICS & GYNECOLOGY

## 2025-02-14 PROCEDURE — 82950 GLUCOSE TEST: CPT | Performed by: OBSTETRICS & GYNECOLOGY

## 2025-02-14 PROCEDURE — 99213 OFFICE O/P EST LOW 20 MIN: CPT | Mod: PBBFAC | Performed by: OBSTETRICS & GYNECOLOGY

## 2025-02-14 RX ORDER — DOCUSATE SODIUM 100 MG/1
100 CAPSULE, LIQUID FILLED ORAL 2 TIMES DAILY
Qty: 60 CAPSULE | Refills: 11 | Status: SHIPPED | OUTPATIENT
Start: 2025-02-14

## 2025-02-14 NOTE — PROGRESS NOTES
30yo  at 24w1d.   Overall doing well today.  Still with some constipation, but improving.  Has now developed hemorrhoids.  No bleeding/spotting.  No ctx/lof.  +FM.   Also reports left hip/sciatic nerve pain.  No other complaints today.  Taking PNV.      PE:     A/P:   Routine OB visit today.   Glucola pending today.   Recommend preparation H for hemorrhoids, avoid straining.  Will start on daily colace.   Continue miralax prn constipation.  Encourage PO hydration.   PT referral for hip/sciatic nerve pain.  Discussed stretches, and lifestyle modifications - hold on to something until pain subsides and can walk normally.   OB ED precautions given.   Counseling done, precautions given, all questions answered.  RTC 4 wks for OB visit.     Yesenia Orozco MD

## 2025-02-14 NOTE — LETTER
February 14, 2025    Penelope Velez  [address]  [address]  [facility] [address]             [facility]  [address]  [place] [address]  Phone: [phone]  Fax: [phone] To Whom It May Concern:     Ms. Penelope Velez is currently pregnant and under my obstetric care.  Her estimated date of delivery is June 5, 2025.  The father of the baby is Mr. Olaf Goodrich, date of birth [birth year] 1989.    If you have any questions or concerns, please don't hesitate to call.    Sincerely,        Yesenia Orozco MD

## 2025-02-20 ENCOUNTER — PATIENT MESSAGE (OUTPATIENT)
Dept: OBSTETRICS AND GYNECOLOGY | Facility: CLINIC | Age: 30
End: 2025-02-20
Payer: OTHER GOVERNMENT

## 2025-02-20 ENCOUNTER — CLINICAL SUPPORT (OUTPATIENT)
Dept: REHABILITATION | Facility: OTHER | Age: 30
End: 2025-02-20
Attending: OBSTETRICS & GYNECOLOGY
Payer: OTHER GOVERNMENT

## 2025-02-20 DIAGNOSIS — M25.552 LEFT HIP PAIN: ICD-10-CM

## 2025-02-20 DIAGNOSIS — M54.32 LEFT SCIATIC NERVE PAIN: ICD-10-CM

## 2025-02-20 DIAGNOSIS — M54.32 SCIATIC PAIN, LEFT: Primary | ICD-10-CM

## 2025-02-20 DIAGNOSIS — Z34.00 SUPERVISION OF NORMAL FIRST PREGNANCY, ANTEPARTUM: ICD-10-CM

## 2025-02-20 PROCEDURE — 97112 NEUROMUSCULAR REEDUCATION: CPT

## 2025-02-20 PROCEDURE — 97530 THERAPEUTIC ACTIVITIES: CPT

## 2025-02-20 PROCEDURE — 97161 PT EVAL LOW COMPLEX 20 MIN: CPT

## 2025-02-20 NOTE — PROGRESS NOTES
Outpatient Rehab    Physical Therapy Evaluation    Patient Name: Penelope Velez  MRN: [id number]  YOB: 1995  Today's Date: 2/20/2025    Therapy Diagnosis:   Encounter Diagnoses   Name Primary?    Supervision of normal first pregnancy, antepartum     Left sciatic nerve pain     Left hip pain     Sciatic pain, left Yes     Physician: Yesenia Orozco MD    Physician Orders: Eval and Treat  Medical Diagnosis:   Z34.00 (ICD-10-CM) - Supervision of normal first pregnancy, antepartum   M54.32 (ICD-10-CM) - Left sciatic nerve pain   M25.552 (ICD-10-CM) - Left hip pain     Visit # / Visits Authorized:  1 / 1 (pending)   Date of Evaluation:  2/20/2025   Insurance Authorization Period: 2/20/25 to 11/16/26  Plan of Care Certification:  2/20/2025 to 4/20/25      Time In:   2:30 PM  Time Out:3:31 PM    Total Time:   60 min.  Total Billable Time: 60 min.    Intake Outcome Measure for FOTO Survey    Therapist reviewed FOTO scores for Penelope Velez on 2/20/2025.   FOTO report - see Media section or FOTO account episode details.     Intake Score: 40%    Precautions     Pregnant (6/5)      Subjective   History of Present Illness  Penelope is a 29 y.o. female who reports to physical therapy with a chief concern of Left low back and left posterior hip pain with radiating pain into the back of left thigh. According to the patient's chart, Penelope has a past medical history of Chicken pox and Hepatitis B. Penelope has no past surgical history on file.    The patient reports a medical diagnosis of Z34.00 (ICD-10-CM) - Supervision of normal first pregnancy, antepartum  M54.32 (ICD-10-CM) - Left sciatic nerve pain  M25.552 (ICD-10-CM) - Left hip pain.            History of Present Condition/Illness: Patient complains of left hip pain that radiates into left low back and down left buttock and left posterior thigh. She states that about 3 weeks ago she started to get the pain when she would stand up from bed and start walking. The pain  started to worsen over the next few weeks. She proceeded to the doctor who explained that it was typical to feel sciatic nerve pain with pregnancy. She reports that she was sick and lost weight during the first trimester. Because of this she ate more in the second trimester and her belly grew so she gained weight. When she transfers from supine in bed to standing she has to brace herself before she starts walking in order to reduce the pain. If she's not on her feet then she doesn't have any pain. Aggravating factors include transferring out of bed in the morning, standing (short/long durations), walking, donning/doffing pants/shoes/socks, and carrying shopping bags.     Activities of Daily Living  Social history was obtained from Patient.          Patient Roles: Caregiver for pet  Patient Responsibilities: Community mobility, Driving, Financial management, Health management, Home management, Laundry, Meal prep, Shopping, Personal ADL    Previously independent with activities of daily living? Yes     Currently independent with activities of daily living? Yes          Previously independent with instrumental activities of daily living? Yes     Currently independent with instrumental activities of daily living? Yes              Pain     Patient reports a current pain level of 9/10. Pain at best is reported as 0/10. Pain at worst is reported as 5/10.   Location: Left low back and left posterior hip pain with radiating pain into the back of left thigh  Clinical Progression (since onset): Worsening  Pain Qualities: Radiating, Pressure, Sharp, Needle-like, Throbbing, Other (Comment)  Other Pain Qualities: shooting  Pain-Relieving Factors: Massage, Lying down, Other (Comment), Rest, Relaxation  Other Pain-Relieving Factors: Hot bath  Pain-Aggravating Factors: Walking, Standing, Holding objects         Review of Systems  Patient reports: Bladder Incontinence and Shortness of Breath  Patient denies: Bowel Incontinence, Chest  Pain, Dizziness, Fever, Headache, Sleep Disturbance, Weight Gain, and Weight Loss  Additional Red Flag Details: SOB & incontinence d/t pregnancy     Treatment History  Treatments  Previously Received Treatments: No  Currently Receiving Treatments: No    Living Arrangements  Living Situation  Housing: Home independently  Living Arrangements: Spouse/significant other  Support Systems: Spouse/significant other    Home Setup  Type of Structure: Apartment/condo  Home Access: Stairs with rails  Entrance Stairs - Number of Steps: 24  Entrance Stairs - Rails: Both  Number of Levels in Home: One level  Primary Bedroom: 1st floor  Primary Bathroom: 1st floor  Kitchen: 1st floor        Employment  Patient does not report that: Does the patient's condition impact their ability to work?  Employment Status: Not employed   Working in [employer]      Past Medical History/Physical Systems Review:   Penelope Velez  has a past medical history of Chicken pox and Hepatitis B.    Penelope Velez  has no past surgical history on file.    Pneelope has a current medication list which includes the following prescription(s): docusate sodium, ondansetron, ondansetron, prenatal vitamin, and polyethylene glycol.    Review of patient's allergies indicates:  No Known Allergies     Objective   Posture  Patient presents with a Forward head position. Increased lumbar lordosis and Flat thoracic spine is observed.     Bilateral scapulae are: Protracted  Pelvic tilt observed: Anterior         Right hip is: ABducted       Lower Extremity Sensation  General Lumbar/Lower Extremity Sensation  Intact: Right and Left  Right Lumbar/Lower Extremity Sensation  Intact: Light Touch, Static Two Point Discrimination, and Proprioception  Right Lumbar/Lower Extremity Sensation Stocking Glove Pattern: No    Left Lumbar/Lower Extremity Sensation  Intact: Light Touch, Static Two Point Discrimination, and Proprioception  Left Lumbar/Lower Extremity Sensation Stocking Glove Pattern:  No             Right Lower Extremity Reflexes  Patellar, L4: Normal (2+)    Hamstring, L5: Normal (2+)    Achilles, S1: Normal (2+)         Left Lower Extremity Reflexes  Patellar, L4: Normal (2+)     Hamstring, L5: Normal (2+)    Achilles, S1: Normal (2+)             Spinal Mobility  Hypomobile: Thoracic and Lumbosacral  Lumbosacral Mobility Details: Decreased S1-5 nutation & L3-5 bilateral transverse glide    Spinal Muscle Palpation  Right Spinal Muscle Palpation  Abnormal: Lumbar/Sacral     TTLP left piriformis, left SI joint line, left greater trochanter, left quadratus lumborum and left iliac crest            Hip Palpation  Right Hip Palpation  Abnormal: Lumbar/Sacral Muscle                       Lumbar Range of Motion   Active (deg) Passive (deg) Pain   Flexion 60 65 Yes   Extension 25 30     Right Lateral Flexion 30 35     Right Rotation 35 35     Left Lateral Flexion 28 33 Yes   Left Rotation 28 30 Yes            Hip Range of Motion   Right Hip   Active (deg) Passive (deg) Pain   Flexion 120       Extension -5       ABduction 45       ADduction         External Rotation 90/90 45       External Rotation Prone         Internal Rotation 90/90 45       Internal Rotation Prone             Left Hip   Active (deg) Passive (deg) Pain   Flexion 120       Extension -5       ABduction 45       ADduction         External Rotation 90/90 45       External Rotation Prone         Internal Rotation 90/90 45       Internal Rotation Prone                      Lumbar Strength   Strength Pain   Flexion 4- Yes   Extension 3+     Right Lateral Flexion 4-     Left Lateral Flexion 3+ Yes   Right Rotation 4-     Left Rotation 3+ Yes                Hip Strength - Planes of Motion   Right Strength Right Pain Left Strength Left  Pain   Flexion (L2) 4-   3+ Yes   Extension 4-   3+     ABduction 4-   4-     ADduction 4-   4-     Internal Rotation 4- Yes 3+     External Rotation 4-   3+ Yes       Knee Strength   Right Strength Right Pain  "Left Strength Left  Pain   Flexion (S2) 4-   4-     Prone Flexion           Extension (L3) 4-   4-            Ankle/Foot Strength - Planes of Motion   Right Strength Right Pain Left Strength Left  Pain   Dorsiflexion (L4) 4-   4-     Plantar Flexion (S1) 4-   4-     Inversion 4-   4-     Eversion 4-   4-     Great Toe Flexion 4-   4-     Great Toe Extension (L5) 4-   4-     Lesser Toes Flexion           Lesser Toes Extension                  Lumbar/Pelvic Girdle Special Tests  Lumbar Tests - Repeated  Positive: Flexion and Left Side Glide  Negative: Extension and Right Side Glide                             Treatment:  Balance/Neuromuscular Re-Education  Balance/Neuromuscular Re-Education Activity 1: PPT x 5" x 3 x 10  Balance/Neuromuscular Re-Education Activity 2: Hooklying marches 3 x 10  Balance/Neuromuscular Re-Education Activity 3: Supine adductor squeeze x 10 " 2 x 10    Therapeutic Activity  Therapeutic Activity 1: Supine LTR 3 x 10 (for bed mobility)  Therapeutic Activity 2: Supine clamshells RTB 3" hold 3 x 10 (for bed mobility)  Therapeutic Activity 3: Supine piriformis stretch x 1' x 2    Assessment & Plan   Assessment  Penelope presents with a condition of Low complexity.   Presentation of Symptoms: Stable  Will Comorbidities Impact Care: Yes  Pregnancy    Functional Limitations: Activity tolerance, Carrying objects, Decreased ambulation distance/endurance, Gait limitations, Functional mobility, Pain with ADLs/IADLs, Range of motion, Standing tolerance, Transfers  Impairments: Activity intolerance, Abnormal muscle firing, Abnormal muscle tone, Abnormal or restricted range of motion, Impaired physical strength, Lack of appropriate home exercise program, Pain with functional activity    Patient Goal for Therapy (PT): "Be able to stand without so much pain."  Assessment Details: Patient presents with that began left hip pain that radiates into left low back and left buttock/posterior left thigh that began " about 3 weeks prior without any specific mechanism of injury. Patient is in her third trimester of pregnancy and reports that she did not gain weight until second trimester. Pertinent clinical findings include tenderness to palpation of left piriformis, left iliac crest, left greater trochanter and left ITB; decreased and painful lumbar active range of motion; decreased and painful left hip strength; decreased thoracic spine strength; hypomobile S1-5 nutation & L3-5 bilateral transverse glide; and positive left repeated lumbar flexion and repeated side bending tests. Signs and symptoms are most consistent with lumbar radiculopathy. Patient's impairments affect his ability to perform functional transfers. Given the severity of the patient's hip and low back, her past medical history, and overall health for her age, a full recovery is expected within 4-6 weeks. Her rehab potential is dependent on compliance with PT recommendations and adherence to his home exercise program. Skilled PT intervention is required to address these key impairments and to provide and progress with an appropriate home exercise program. This evaluation is of low complexity due to the stable nature of the patient's presentation as well as the comorbidities and medical factors included in this evaluation.The patient has been educated in the evaluation findings, prognosis, and plan of care, HEP and is in agreement and willing to participate in therapy.    Plan  From a physical therapy perspective, the patient would benefit from: Skilled Rehab Services    Planned therapy interventions include: Therapeutic exercise, Therapeutic activities, Neuromuscular re-education, Manual therapy, ADLs/IADLs, and Gait training.    Planned modalities to include: Cryotherapy (cold pack), Electrical stimulation - attended, Electrical stimulation - passive/unattended, and Diathermy.        Visit Frequency: 2 times Per Week for 6 Weeks.       This plan was discussed  with Patient.   Discussion participants: Agreed Upon Plan of Care             Patient's spiritual, cultural, and educational needs considered and patient agreeable to plan of care and goals.           Goals:   Active       Long-term goals       Report decreased in pain at worse less than  <   / =  5  /10  to increase tolerance for functional activities.On going       Start:  02/20/25    Expected End:  04/17/25             Increased gross left hip MMT  strength 1 to increase tolerance for ADL and work activities.       Start:  02/20/25    Expected End:  04/17/25            Pt to improve global lumbar range of motion by 90% to allow for improved functional mobility to allow for improvement in IADL's.        Start:  02/20/25    Expected End:  04/17/25            Patient will report no greater than 3/10 left hip pain when transferring out of bed in the morning.       Start:  02/20/25    Expected End:  04/17/25            Patient will report no left buttock and posterior left thigh pain in order to indicate full centralization of symptoms in order to allow for greater tolerance to weight bearing ADLs.       Start:  02/20/25    Expected End:  04/17/25               Short-term goals       Report decreased in pain at worse less than  <   / =  6  /10  to increase tolerance for functional activities.On going       Start:  02/20/25    Expected End:  03/20/25            Pt to improve gross lumbar range of motion by 50% to allow for improved functional mobility to allow for improvement in IADL's       Start:  02/20/25    Expected End:  03/20/25             Increased gross left hip MMT  strength  1/2  to increase tolerance for ADL and work activities.       Start:  02/20/25    Expected End:  03/20/25            Pt to tolerate HEP to improve ROM and independence with ADL's       Start:  02/20/25    Expected End:  03/20/25                Sarah Dolan, PT

## 2025-02-27 ENCOUNTER — CLINICAL SUPPORT (OUTPATIENT)
Dept: REHABILITATION | Facility: OTHER | Age: 30
End: 2025-02-27
Payer: OTHER GOVERNMENT

## 2025-02-27 ENCOUNTER — PATIENT MESSAGE (OUTPATIENT)
Dept: OTHER | Facility: OTHER | Age: 30
End: 2025-02-27
Payer: OTHER GOVERNMENT

## 2025-02-27 DIAGNOSIS — M54.32 SCIATIC PAIN, LEFT: ICD-10-CM

## 2025-02-27 DIAGNOSIS — M25.552 LEFT HIP PAIN: Primary | ICD-10-CM

## 2025-02-27 DIAGNOSIS — Z34.00 SUPERVISION OF NORMAL FIRST PREGNANCY, ANTEPARTUM: ICD-10-CM

## 2025-02-27 PROCEDURE — 97140 MANUAL THERAPY 1/> REGIONS: CPT

## 2025-02-27 PROCEDURE — 97110 THERAPEUTIC EXERCISES: CPT

## 2025-02-27 PROCEDURE — 97112 NEUROMUSCULAR REEDUCATION: CPT

## 2025-02-27 NOTE — PROGRESS NOTES
Outpatient Rehab    Physical Therapy Visit    Patient Name: Penelope Velez  MRN: [id number]  YOB: 1995  Encounter Date: 2/27/2025    Therapy Diagnosis:   Encounter Diagnoses   Name Primary?    Left hip pain Yes    Sciatic pain, left     Supervision of normal first pregnancy, antepartum      Physician: Yesenia Orozco MD    Physician Orders: Eval and Treat  Medical Diagnosis:   Z34.00 (ICD-10-CM) - Supervision of normal first pregnancy, antepartum   M54.32 (ICD-10-CM) - Left sciatic nerve pain   M25.552 (ICD-10-CM) - Left hip pain      Visit # / Visits Authorized:  1 / 1 (pending)   Date of Evaluation:  2/20/2025   Insurance Authorization Period: 2/20/25 to 11/16/26  Plan of Care Certification:  2/20/2025 to 4/20/25                 Time In:  1:50 PM  Time Out: 2:50 PM    Total Time:  60 min.  Total Billable Time: 60 min.    Precautions  Pregnant         Subjective   Patient reports that she was hurting during her session, but after she felt better. Still having high levels of pain to the L posterior hip area..  Pain reported as 8/10.      Objective            Treatment:  Therapeutic Exercise  Therapeutic Exercise Activity 1: Treadmill x 5 min for joint nutrition  Therapeutic Exercise Activity 2: LTR x 2 minutes 2 second holds  Therapeutic Exercise Activity 3: Sidelying open books x 10 reps laying on L side  Therapeutic Exercise Activity 4: Seated piriformis stretch 3 x 30 seconds both sides  Therapeutic Exercise Activity 5: Seated lat pull downs on ball blue TB 2 x 10 3 second holds    Manual Therapy  Manual Therapy Activity 1: STM to L piriformis and L glut med; active release with hold and patient performing clams    Balance/Neuromuscular Re-Education  Balance/Neuromuscular Re-Education Activity 1: Supine TA x 10 reps with legs up on ball in Z lie position  Balance/Neuromuscular Re-Education Activity 2: Z lie marches with TA (legs up on ball in Z lie) x 10 reps on each side- more painful to perform  on L side  Balance/Neuromuscular Re-Education Activity 3: Seated adductor squeeze x 10 x 10 second holds  Balance/Neuromuscular Re-Education Activity 4: Z lie breathing x 2 minutes with focus on relaxation                        Assessment & Plan   Assessment:         Patient will continue to benefit from skilled outpatient physical therapy to address the deficits listed in the problem list box on initial evaluation, provide pt/family education and to maximize pt's level of independence in the home and community environment.     Patient's spiritual, cultural, and educational needs considered and patient agreeable to plan of care and goals.           Plan:      Goals:   Active       Long-term goals       Report decreased in pain at worse less than  <   / =  5  /10  to increase tolerance for functional activities.On going       Start:  02/20/25    Expected End:  04/17/25             Increased gross left hip MMT  strength 1 to increase tolerance for ADL and work activities.       Start:  02/20/25    Expected End:  04/17/25            Pt to improve global lumbar range of motion by 90% to allow for improved functional mobility to allow for improvement in IADL's.        Start:  02/20/25    Expected End:  04/17/25            Patient will report no greater than 3/10 left hip pain when transferring out of bed in the morning.       Start:  02/20/25    Expected End:  04/17/25            Patient will report no left buttock and posterior left thigh pain in order to indicate full centralization of symptoms in order to allow for greater tolerance to weight bearing ADLs.       Start:  02/20/25    Expected End:  04/17/25               Short-term goals       Report decreased in pain at worse less than  <   / =  6  /10  to increase tolerance for functional activities.On going       Start:  02/20/25    Expected End:  03/20/25            Pt to improve gross lumbar range of motion by 50% to allow for improved functional mobility to allow  for improvement in IADL's       Start:  02/20/25    Expected End:  03/20/25             Increased gross left hip MMT  strength  1/2  to increase tolerance for ADL and work activities.       Start:  02/20/25    Expected End:  03/20/25            Pt to tolerate HEP to improve ROM and independence with ADL's       Start:  02/20/25    Expected End:  03/20/25                Surekha Diaz, PT

## 2025-03-05 ENCOUNTER — CLINICAL SUPPORT (OUTPATIENT)
Dept: REHABILITATION | Facility: OTHER | Age: 30
End: 2025-03-05
Payer: OTHER GOVERNMENT

## 2025-03-05 DIAGNOSIS — M25.552 LEFT HIP PAIN: Primary | ICD-10-CM

## 2025-03-05 DIAGNOSIS — Z34.00 SUPERVISION OF NORMAL FIRST PREGNANCY, ANTEPARTUM: ICD-10-CM

## 2025-03-05 DIAGNOSIS — M54.32 SCIATIC PAIN, LEFT: ICD-10-CM

## 2025-03-05 PROCEDURE — 97112 NEUROMUSCULAR REEDUCATION: CPT | Mod: CQ

## 2025-03-05 PROCEDURE — 97110 THERAPEUTIC EXERCISES: CPT | Mod: CQ

## 2025-03-05 NOTE — PROGRESS NOTES
Outpatient Rehab    Physical Therapy Visit    Patient Name: Penelope Velez  MRN: [id number]  YOB: 1995  Encounter Date: 3/5/2025    Therapy Diagnosis:   Encounter Diagnoses   Name Primary?    Left hip pain Yes    Sciatic pain, left     Supervision of normal first pregnancy, antepartum      Physician: Yeesnia Orozco MD    Physician Orders: Eval and Treat  Medical Diagnosis:   Z34.00 (ICD-10-CM) - Supervision of normal first pregnancy, antepartum   M54.32 (ICD-10-CM) - Left sciatic nerve pain   M25.552 (ICD-10-CM) - Left hip pain      Visit # / Visits Authorized:  1 / 12  Date of Evaluation:  2/20/2025   Insurance Authorization Period: 2/20/25 to 11/16/26  Plan of Care Certification:  2/20/2025 to 4/20/25                 Time In:  1330  Time Out: 1430  Total Time:  60 min.  Total Billable Time: 30 min.    Precautions  Pregnant         Subjective   she has been feeling better the last couple of days because she has not been walking much.  Pain reported as 4/10.      Objective            Treatment:  Therapeutic Exercise  Therapeutic Exercise Activity 1: Treadmill x 5 min for joint nutrition  Therapeutic Exercise Activity 2: LTR x 2 minutes 2 second holds  Therapeutic Exercise Activity 3: Sidelying open books x 10 reps laying on L side  Therapeutic Exercise Activity 4: supine piriformis stretch 3 x 30 seconds both sides  Therapeutic Exercise Activity 5: Seated lat pull downs on ball blue TB 2 x 10 3 second holds    Manual Therapy  Manual Therapy Activity 1: STM to L piriformis and L glut med; active release with hold and patient performing clams    Balance/Neuromuscular Re-Education  Balance/Neuromuscular Re-Education Activity 1: Supine TA x 10 reps with legs up on ball in Z lie position  Balance/Neuromuscular Re-Education Activity 2: Med X lumbar ext 20# x5; 36# x15  Balance/Neuromuscular Re-Education Activity 3: supine adductor squeeze x 10 x 10 second holds  Balance/Neuromuscular Re-Education  Activity 4: STS with focus on TA activation 2x10 (improvement in pain)                        Assessment & Plan   Assessment: pt presents to therapy with improved pain levels comapred to last visit secondary to less activity. Good tolerance to todays exercises. increased fucntional/ standing exercises tolerated well. improvement in pain with functional movements when pt cued to contract tA       Patient will continue to benefit from skilled outpatient physical therapy to address the deficits listed in the problem list box on initial evaluation, provide pt/family education and to maximize pt's level of independence in the home and community environment.     Patient's spiritual, cultural, and educational needs considered and patient agreeable to plan of care and goals.           Plan: continue POC as tolerated by Pt    Goals:   Active       Long-term goals       Report decreased in pain at worse less than  <   / =  5  /10  to increase tolerance for functional activities.On going       Start:  02/20/25    Expected End:  04/17/25             Increased gross left hip MMT  strength 1 to increase tolerance for ADL and work activities.       Start:  02/20/25    Expected End:  04/17/25            Pt to improve global lumbar range of motion by 90% to allow for improved functional mobility to allow for improvement in IADL's.        Start:  02/20/25    Expected End:  04/17/25            Patient will report no greater than 3/10 left hip pain when transferring out of bed in the morning.       Start:  02/20/25    Expected End:  04/17/25            Patient will report no left buttock and posterior left thigh pain in order to indicate full centralization of symptoms in order to allow for greater tolerance to weight bearing ADLs.       Start:  02/20/25    Expected End:  04/17/25               Short-term goals       Report decreased in pain at worse less than  <   / =  6  /10  to increase tolerance for functional activities.On going        Start:  02/20/25    Expected End:  03/20/25            Pt to improve gross lumbar range of motion by 50% to allow for improved functional mobility to allow for improvement in IADL's       Start:  02/20/25    Expected End:  03/20/25             Increased gross left hip MMT  strength  1/2  to increase tolerance for ADL and work activities.       Start:  02/20/25    Expected End:  03/20/25            Pt to tolerate HEP to improve ROM and independence with ADL's       Start:  02/20/25    Expected End:  03/20/25                Leo Krishnamurthy, PTA

## 2025-03-07 ENCOUNTER — CLINICAL SUPPORT (OUTPATIENT)
Dept: REHABILITATION | Facility: OTHER | Age: 30
End: 2025-03-07
Payer: OTHER GOVERNMENT

## 2025-03-07 DIAGNOSIS — M54.32 SCIATIC PAIN, LEFT: ICD-10-CM

## 2025-03-07 DIAGNOSIS — M25.552 LEFT HIP PAIN: Primary | ICD-10-CM

## 2025-03-07 DIAGNOSIS — Z34.00 SUPERVISION OF NORMAL FIRST PREGNANCY, ANTEPARTUM: ICD-10-CM

## 2025-03-07 PROCEDURE — 97112 NEUROMUSCULAR REEDUCATION: CPT | Mod: CQ

## 2025-03-07 PROCEDURE — 97110 THERAPEUTIC EXERCISES: CPT | Mod: CQ

## 2025-03-07 NOTE — PROGRESS NOTES
"  Outpatient Rehab    Physical Therapy Visit    Patient Name: Penelope Velez  MRN: [id number]  YOB: 1995  Encounter Date: 3/7/2025    Therapy Diagnosis:   Encounter Diagnoses   Name Primary?    Left hip pain Yes    Sciatic pain, left     Supervision of normal first pregnancy, antepartum        Physician: Yesenia Orozco MD    Physician Orders: Eval and Treat  Medical Diagnosis:   Z34.00 (ICD-10-CM) - Supervision of normal first pregnancy, antepartum   M54.32 (ICD-10-CM) - Left sciatic nerve pain   M25.552 (ICD-10-CM) - Left hip pain      Visit # / Visits Authorized:  1 / 12  Date of Evaluation:  2/20/2025   Insurance Authorization Period: 2/20/25 to 11/16/26  Plan of Care Certification:  2/20/2025 to 4/20/25                 Time In:  1230  Time Out: 125  Total Time:  55 min.  Total Billable Time: 55 min.    Precautions  Pregnant         Subjective   She is doing okay. She didn't have much pain after last session. She has been feeling so good but she has been afraid to do her normal walk..  Pain reported as 0/10.      Objective            Treatment:  Therapeutic Exercise  Therapeutic Exercise Activity 1: Treadmill x 5 min for joint nutrition  Therapeutic Exercise Activity 2: LTR x 2 minutes 2 second holds  Therapeutic Exercise Activity 3: Sidelying open books x 10 reps laying on L side  Therapeutic Exercise Activity 4: supine piriformis stretch 3 x 30 seconds both sides  Therapeutic Exercise Activity 5: Seated lat pull downs on ball blue TB 2 x 10 3 second holds  Therapeutic Exercise Activity 6: Supine clams GTB 2 x 10 x 3"         Balance/Neuromuscular Re-Education  Balance/Neuromuscular Re-Education Activity 1: Supine TA 2 x 10 reps with legs up on ball in Z lie position  Balance/Neuromuscular Re-Education Activity 2: Med X lumbar ext 20# x5; 36# x18  Balance/Neuromuscular Re-Education Activity 3: supine adductor squeeze x 10 x 10 second holds  Balance/Neuromuscular Re-Education Activity 4: STS with " "focus on TA activation 2x10 (improvement in pain)  Balance/Neuromuscular Re-Education Activity 5: Standing hip abd RTB 2 x 10 x 3" ea  Balance/Neuromuscular Re-Education Activity 6: BKFO with TA GTB 2 x 10 x 3"    Therapeutic Activity  Therapeutic Activity 1: Self release with lacross ball x 2'                   Assessment & Plan   Assessment: Pt presents with improved symptoms. Progressed core and lumbopelvic stability with appropriate challenge. Progress as tolerated.  Evaluation/Treatment Tolerance: Patient tolerated treatment well    Patient will continue to benefit from skilled outpatient physical therapy to address the deficits listed in the problem list box on initial evaluation, provide pt/family education and to maximize pt's level of independence in the home and community environment.     Patient's spiritual, cultural, and educational needs considered and patient agreeable to plan of care and goals.           Plan: continue POC as tolerated by Pt    Goals:   Active       Long-term goals       Report decreased in pain at worse less than  <   / =  5  /10  to increase tolerance for functional activities.On going (Progressing)       Start:  02/20/25    Expected End:  04/17/25             Increased gross left hip MMT  strength 1 to increase tolerance for ADL and work activities. (Progressing)       Start:  02/20/25    Expected End:  04/17/25            Pt to improve global lumbar range of motion by 90% to allow for improved functional mobility to allow for improvement in IADL's.  (Progressing)       Start:  02/20/25    Expected End:  04/17/25            Patient will report no greater than 3/10 left hip pain when transferring out of bed in the morning. (Progressing)       Start:  02/20/25    Expected End:  04/17/25            Patient will report no left buttock and posterior left thigh pain in order to indicate full centralization of symptoms in order to allow for greater tolerance to weight bearing ADLs. " (Progressing)       Start:  02/20/25    Expected End:  04/17/25               Short-term goals       Report decreased in pain at worse less than  <   / =  6  /10  to increase tolerance for functional activities.On going (Progressing)       Start:  02/20/25    Expected End:  03/20/25            Pt to improve gross lumbar range of motion by 50% to allow for improved functional mobility to allow for improvement in IADL's (Progressing)       Start:  02/20/25    Expected End:  03/20/25             Increased gross left hip MMT  strength  1/2  to increase tolerance for ADL and work activities. (Progressing)       Start:  02/20/25    Expected End:  03/20/25            Pt to tolerate HEP to improve ROM and independence with ADL's (Progressing)       Start:  02/20/25    Expected End:  03/20/25                Kevin Esquivel, PTA

## 2025-03-12 ENCOUNTER — CLINICAL SUPPORT (OUTPATIENT)
Dept: REHABILITATION | Facility: OTHER | Age: 30
End: 2025-03-12
Payer: OTHER GOVERNMENT

## 2025-03-12 DIAGNOSIS — M54.32 SCIATIC PAIN, LEFT: Primary | ICD-10-CM

## 2025-03-12 PROCEDURE — 97112 NEUROMUSCULAR REEDUCATION: CPT | Mod: CQ

## 2025-03-12 PROCEDURE — 97110 THERAPEUTIC EXERCISES: CPT | Mod: CQ

## 2025-03-12 NOTE — PROGRESS NOTES
"  Outpatient Rehab    Physical Therapy Visit    Patient Name: Penelope Velez  MRN: [id number]  YOB: 1995  Encounter Date: 3/12/2025    Therapy Diagnosis:   No diagnosis found.      Physician: Yesenia Orozco MD    Physician Orders: Eval and Treat  Medical Diagnosis:   Z34.00 (ICD-10-CM) - Supervision of normal first pregnancy, antepartum   M54.32 (ICD-10-CM) - Left sciatic nerve pain   M25.552 (ICD-10-CM) - Left hip pain      Visit # / Visits Authorized:  1 / 12  Date of Evaluation:  2/20/2025   Insurance Authorization Period: 2/20/25 to 11/16/26  Plan of Care Certification:  2/20/2025 to 4/20/25                 Time In:  1230  Time Out: 125  Total Time:  55 min.  Total Billable Time: 55 min.    Precautions  Pregnant         Subjective             Objective            Treatment:  Therapeutic Exercise  Therapeutic Exercise Activity 1: Treadmill x 5 min for joint nutrition  Therapeutic Exercise Activity 2: LTR x 2 minutes 2 second holds  Therapeutic Exercise Activity 3: Sidelying open books x 10 reps laying on L side  Therapeutic Exercise Activity 4: supine piriformis stretch 3 x 30 seconds both sides  Therapeutic Exercise Activity 5: Seated lat pull downs on ball blue TB 2 x 10 3 second holds  Therapeutic Exercise Activity 6: Supine clams GTB 2 x 10 x 3"         Balance/Neuromuscular Re-Education  Balance/Neuromuscular Re-Education Activity 1: Supine TA x 10 reps with legs up on ball in Z lie position  Balance/Neuromuscular Re-Education Activity 2: Med X lumbar ext 20# x5; 36# x15  Balance/Neuromuscular Re-Education Activity 3: supine adductor squeeze x 10 x 10 second holds  Balance/Neuromuscular Re-Education Activity 4: STS with focus on TA activation 2x10 (improvement in pain)                        Assessment & Plan   Assessment:         Patient will continue to benefit from skilled outpatient physical therapy to address the deficits listed in the problem list box on initial evaluation, provide " pt/family education and to maximize pt's level of independence in the home and community environment.     Patient's spiritual, cultural, and educational needs considered and patient agreeable to plan of care and goals.           Plan:      Goals:   Active       Long-term goals       Report decreased in pain at worse less than  <   / =  5  /10  to increase tolerance for functional activities.On going (Progressing)       Start:  02/20/25    Expected End:  04/17/25             Increased gross left hip MMT  strength 1 to increase tolerance for ADL and work activities. (Progressing)       Start:  02/20/25    Expected End:  04/17/25            Pt to improve global lumbar range of motion by 90% to allow for improved functional mobility to allow for improvement in IADL's.  (Progressing)       Start:  02/20/25    Expected End:  04/17/25            Patient will report no greater than 3/10 left hip pain when transferring out of bed in the morning. (Progressing)       Start:  02/20/25    Expected End:  04/17/25            Patient will report no left buttock and posterior left thigh pain in order to indicate full centralization of symptoms in order to allow for greater tolerance to weight bearing ADLs. (Progressing)       Start:  02/20/25    Expected End:  04/17/25               Short-term goals       Report decreased in pain at worse less than  <   / =  6  /10  to increase tolerance for functional activities.On going (Progressing)       Start:  02/20/25    Expected End:  03/20/25            Pt to improve gross lumbar range of motion by 50% to allow for improved functional mobility to allow for improvement in IADL's (Progressing)       Start:  02/20/25    Expected End:  03/20/25             Increased gross left hip MMT  strength  1/2  to increase tolerance for ADL and work activities. (Progressing)       Start:  02/20/25    Expected End:  03/20/25            Pt to tolerate HEP to improve ROM and independence with ADL's  (Progressing)       Start:  02/20/25    Expected End:  03/20/25                Leo Krishnamurthy PTA

## 2025-03-13 ENCOUNTER — PATIENT MESSAGE (OUTPATIENT)
Dept: OTHER | Facility: OTHER | Age: 30
End: 2025-03-13
Payer: OTHER GOVERNMENT

## 2025-03-14 ENCOUNTER — LAB VISIT (OUTPATIENT)
Dept: LAB | Facility: OTHER | Age: 30
End: 2025-03-14
Attending: OBSTETRICS & GYNECOLOGY
Payer: OTHER GOVERNMENT

## 2025-03-14 ENCOUNTER — CLINICAL SUPPORT (OUTPATIENT)
Dept: REHABILITATION | Facility: OTHER | Age: 30
End: 2025-03-14
Payer: OTHER GOVERNMENT

## 2025-03-14 ENCOUNTER — ROUTINE PRENATAL (OUTPATIENT)
Dept: OBSTETRICS AND GYNECOLOGY | Facility: CLINIC | Age: 30
End: 2025-03-14
Payer: OTHER GOVERNMENT

## 2025-03-14 VITALS
SYSTOLIC BLOOD PRESSURE: 122 MMHG | BODY MASS INDEX: 33.84 KG/M2 | DIASTOLIC BLOOD PRESSURE: 81 MMHG | WEIGHT: 216.06 LBS

## 2025-03-14 DIAGNOSIS — R06.02 SHORTNESS OF BREATH DURING PREGNANCY: ICD-10-CM

## 2025-03-14 DIAGNOSIS — Z34.00 SUPERVISION OF NORMAL FIRST PREGNANCY, ANTEPARTUM: Primary | ICD-10-CM

## 2025-03-14 DIAGNOSIS — M54.32 SCIATIC PAIN, LEFT: ICD-10-CM

## 2025-03-14 DIAGNOSIS — Z34.00 SUPERVISION OF NORMAL FIRST PREGNANCY, ANTEPARTUM: ICD-10-CM

## 2025-03-14 DIAGNOSIS — O99.891 SHORTNESS OF BREATH DURING PREGNANCY: ICD-10-CM

## 2025-03-14 DIAGNOSIS — N89.8 VAGINAL DISCHARGE: ICD-10-CM

## 2025-03-14 DIAGNOSIS — M25.552 LEFT HIP PAIN: Primary | ICD-10-CM

## 2025-03-14 LAB
BASOPHILS # BLD AUTO: 0.04 K/UL (ref 0–0.2)
BASOPHILS NFR BLD: 0.3 % (ref 0–1.9)
DIFFERENTIAL METHOD BLD: ABNORMAL
EOSINOPHIL # BLD AUTO: 0.1 K/UL (ref 0–0.5)
EOSINOPHIL NFR BLD: 0.7 % (ref 0–8)
ERYTHROCYTE [DISTWIDTH] IN BLOOD BY AUTOMATED COUNT: 12.8 % (ref 11.5–14.5)
HCT VFR BLD AUTO: 38.7 % (ref 37–48.5)
HGB BLD-MCNC: 12.8 G/DL (ref 12–16)
IMM GRANULOCYTES # BLD AUTO: 0.12 K/UL (ref 0–0.04)
IMM GRANULOCYTES NFR BLD AUTO: 0.9 % (ref 0–0.5)
LYMPHOCYTES # BLD AUTO: 1.9 K/UL (ref 1–4.8)
LYMPHOCYTES NFR BLD: 14.8 % (ref 18–48)
MCH RBC QN AUTO: 29.8 PG (ref 27–31)
MCHC RBC AUTO-ENTMCNC: 33.1 G/DL (ref 32–36)
MCV RBC AUTO: 90 FL (ref 82–98)
MONOCYTES # BLD AUTO: 0.7 K/UL (ref 0.3–1)
MONOCYTES NFR BLD: 5.4 % (ref 4–15)
NEUTROPHILS # BLD AUTO: 10.2 K/UL (ref 1.8–7.7)
NEUTROPHILS NFR BLD: 77.9 % (ref 38–73)
NRBC BLD-RTO: 0 /100 WBC
PLATELET # BLD AUTO: 163 K/UL (ref 150–450)
PMV BLD AUTO: 11.3 FL (ref 9.2–12.9)
RBC # BLD AUTO: 4.3 M/UL (ref 4–5.4)
WBC # BLD AUTO: 13.1 K/UL (ref 3.9–12.7)

## 2025-03-14 PROCEDURE — 85025 COMPLETE CBC W/AUTO DIFF WBC: CPT | Performed by: OBSTETRICS & GYNECOLOGY

## 2025-03-14 PROCEDURE — 97110 THERAPEUTIC EXERCISES: CPT

## 2025-03-14 PROCEDURE — 81515 NFCT DS BV&VAGINITIS DNA ALG: CPT | Performed by: OBSTETRICS & GYNECOLOGY

## 2025-03-14 PROCEDURE — 99213 OFFICE O/P EST LOW 20 MIN: CPT | Mod: PBBFAC | Performed by: OBSTETRICS & GYNECOLOGY

## 2025-03-14 PROCEDURE — 36415 COLL VENOUS BLD VENIPUNCTURE: CPT | Performed by: OBSTETRICS & GYNECOLOGY

## 2025-03-14 PROCEDURE — 97112 NEUROMUSCULAR REEDUCATION: CPT

## 2025-03-14 PROCEDURE — 99999 PR PBB SHADOW E&M-EST. PATIENT-LVL III: CPT | Mod: PBBFAC,,, | Performed by: OBSTETRICS & GYNECOLOGY

## 2025-03-14 NOTE — PROGRESS NOTES
28yo  at 28w1d.   Overall doing well today, but does report vaginal discharge/leaking off and on.  No large gush.  No itching/irritation.  She also reports occasional episodes of SOB and dizziness.  Comes on all of a sudden, resolves on its own in 10-20 minutes.  No syncopal episodes, sits down and it goes away.  Does not currently feel these symptoms.  Constipation improved with colace.  Hemorrhoids stable.  No ctx/vb/lof.  +FM.   Also reports left hip/sciatic nerve pain.  No other complaints today.  Taking PNV.      Speculum exam: Negative pooling.  Small amt thin white discharge noted, cx taken.  Cvx closed.     A/P:   Routine OB visit today.   Vaginal cx pending, likely physiologic discharge.  No evidence of PPROM.   Cardiology referral due to SOB, ED precautions given.  CBC pending today.   Continue daily colace. Encourage PO hydration.  Vaccines discussed - will plan tdap and RSV together at 32wga.   OB ED precautions given.   Counseling done, precautions given, all questions answered.  RTC 3 wks for OB visit.     Yesenia Orozco MD

## 2025-03-14 NOTE — PROGRESS NOTES
"  Outpatient Rehab    Physical Therapy Visit    Patient Name: Penelope Velez  MRN: [id number]  YOB: 1995  Encounter Date: 3/14/2025    Therapy Diagnosis:   Encounter Diagnoses   Name Primary?    Left hip pain Yes    Sciatic pain, left     Supervision of normal first pregnancy, antepartum      Physician: Yesenia Orozco MD    Physician Orders: Eval and Treat  Medical Diagnosis:   Z34.00 (ICD-10-CM) - Supervision of normal first pregnancy, antepartum   M54.32 (ICD-10-CM) - Left sciatic nerve pain   M25.552 (ICD-10-CM) - Left hip pain      Visit # / Visits Authorized:  1 / 12  Date of Evaluation:  2/20/2025   Insurance Authorization Period: 2/20/25 to 11/16/26  Plan of Care Certification:  2/20/2025 to 4/20/25      Time In: 1100   Time Out: 1155  Total Time: 55   Total Billable Time: 55    FOTO:  Intake Score:  %  Survey Score 1:  %  Survey Score 2:  %         Subjective   Patient denies pain at the moment..  Pain reported as 0/10.      Objective            Treatment:  Therapeutic Exercise  Therapeutic Exercise Activity 1: Treadmill x 5 min for joint nutrition  Therapeutic Exercise Activity 2: LTR x 2 minutes 2 second holds  Therapeutic Exercise Activity 3: Sidelying open books x 10 reps laying on L side  Therapeutic Exercise Activity 4: supine piriformis stretch 3 x 30 seconds both sides  Therapeutic Exercise Activity 5: Seated lat pull downs on ball blue TB 2 x 10 3 second holds  Therapeutic Exercise Activity 6: Supine clams GTB 2 x 10 x 3"         Balance/Neuromuscular Re-Education  Balance/Neuromuscular Re-Education Activity 1: Supine TA x 10 reps with legs up on ball in Z lie position  Balance/Neuromuscular Re-Education Activity 2: Med X lumbar ext 20# x5; 40# x20  Balance/Neuromuscular Re-Education Activity 3: supine adductor squeeze x 10 x 10 second holds  Balance/Neuromuscular Re-Education Activity 4: STS with focus on TA activation 2x10 + 10#  Balance/Neuromuscular Re-Education Activity 5: " "Standing hip abd RTB 2 x 10 x 3" ea  Balance/Neuromuscular Re-Education Activity 6: BKFO with TA GTB 2 x 10 x 3"  Balance/Neuromuscular Re-Education Activity 7: paloff press GTB 2x10  Balance/Neuromuscular Re-Education Activity 8: Straight Arm Pulldown BTB with march 2x10         Assessment & Plan   Assessment: Pt presents to therapy without adverse symptoms. She performed exercises well without pain reproduction or dificulty. Will continue to monitor and progress exercises as tolerated by pt to further address impairments and improve overal functional mobility.  Evaluation/Treatment Tolerance: Patient tolerated treatment well    Patient will continue to benefit from skilled outpatient physical therapy to address the deficits listed in the problem list box on initial evaluation, provide pt/family education and to maximize pt's level of independence in the home and community environment.     Patient's spiritual, cultural, and educational needs considered and patient agreeable to plan of care and goals.           Plan: continue POC as tolerated by Pt    Goals:   Active       Long-term goals       Report decreased in pain at worse less than  <   / =  5  /10  to increase tolerance for functional activities.On going (Progressing)       Start:  02/20/25    Expected End:  04/17/25             Increased gross left hip MMT  strength 1 to increase tolerance for ADL and work activities. (Progressing)       Start:  02/20/25    Expected End:  04/17/25            Pt to improve global lumbar range of motion by 90% to allow for improved functional mobility to allow for improvement in IADL's.  (Progressing)       Start:  02/20/25    Expected End:  04/17/25            Patient will report no greater than 3/10 left hip pain when transferring out of bed in the morning. (Progressing)       Start:  02/20/25    Expected End:  04/17/25            Patient will report no left buttock and posterior left thigh pain in order to indicate full " centralization of symptoms in order to allow for greater tolerance to weight bearing ADLs. (Progressing)       Start:  02/20/25    Expected End:  04/17/25               Short-term goals       Report decreased in pain at worse less than  <   / =  6  /10  to increase tolerance for functional activities.On going (Progressing)       Start:  02/20/25    Expected End:  03/20/25            Pt to improve gross lumbar range of motion by 50% to allow for improved functional mobility to allow for improvement in IADL's (Progressing)       Start:  02/20/25    Expected End:  03/20/25             Increased gross left hip MMT  strength  1/2  to increase tolerance for ADL and work activities. (Progressing)       Start:  02/20/25    Expected End:  03/20/25            Pt to tolerate HEP to improve ROM and independence with ADL's (Progressing)       Start:  02/20/25    Expected End:  03/20/25                Rose Granda, PT

## 2025-03-15 LAB
BACTERIAL VAGINOSIS DNA: NOT DETECTED
CANDIDA GLABRATA/KRUSEI: NOT DETECTED
CANDIDA RRNA VAG QL PROBE: NOT DETECTED
TRICHOMONAS VAGINALIS: NOT DETECTED

## 2025-03-19 ENCOUNTER — CLINICAL SUPPORT (OUTPATIENT)
Dept: REHABILITATION | Facility: OTHER | Age: 30
End: 2025-03-19
Payer: OTHER GOVERNMENT

## 2025-03-19 DIAGNOSIS — M54.32 SCIATIC PAIN, LEFT: ICD-10-CM

## 2025-03-19 DIAGNOSIS — Z34.00 SUPERVISION OF NORMAL FIRST PREGNANCY, ANTEPARTUM: ICD-10-CM

## 2025-03-19 DIAGNOSIS — M25.552 LEFT HIP PAIN: Primary | ICD-10-CM

## 2025-03-19 PROCEDURE — 97112 NEUROMUSCULAR REEDUCATION: CPT | Mod: CQ

## 2025-03-19 PROCEDURE — 97110 THERAPEUTIC EXERCISES: CPT | Mod: CQ

## 2025-03-19 NOTE — PROGRESS NOTES
Outpatient Rehab    Physical Therapy Visit    Patient Name: Penelope Velez  MRN: [id number]  YOB: 1995  Encounter Date: 3/19/2025    Therapy Diagnosis:   Encounter Diagnoses   Name Primary?    Left hip pain Yes    Sciatic pain, left     Supervision of normal first pregnancy, antepartum        Physician: Yesenia Orozco MD    Physician Orders: Eval and Treat  Medical Diagnosis:   Z34.00 (ICD-10-CM) - Supervision of normal first pregnancy, antepartum   M54.32 (ICD-10-CM) - Left sciatic nerve pain   M25.552 (ICD-10-CM) - Left hip pain      Visit # / Visits Authorized:  1 / 12  Date of Evaluation:  2/20/2025   Insurance Authorization Period: 2/20/25 to 11/16/26  Plan of Care Certification:  2/20/2025 to 4/20/25      Time In:   1330  Time Out:  1430  Total Time:   60  Total Billable Time: 55    FOTO:  Intake Score:  %  Survey Score 1:  %  Survey Score 2:  %         Subjective   Pt reports pain is  still much improved even with increased activity.  Pain reported as 0/10.      Objective            Treatment:                      Assessment & Plan   Assessment: Good tolerance to all therex. no increase in pain or symptoms. able to progress fucnctional core and LE strengthening       Patient will continue to benefit from skilled outpatient physical therapy to address the deficits listed in the problem list box on initial evaluation, provide pt/family education and to maximize pt's level of independence in the home and community environment.     Patient's spiritual, cultural, and educational needs considered and patient agreeable to plan of care and goals.           Plan: continue POC as tolerated by Pt    Goals:   Active       Long-term goals       Report decreased in pain at worse less than  <   / =  5  /10  to increase tolerance for functional activities.On going (Progressing)       Start:  02/20/25    Expected End:  04/17/25             Increased gross left hip MMT  strength 1 to increase tolerance for  ADL and work activities. (Progressing)       Start:  02/20/25    Expected End:  04/17/25            Pt to improve global lumbar range of motion by 90% to allow for improved functional mobility to allow for improvement in IADL's.  (Progressing)       Start:  02/20/25    Expected End:  04/17/25            Patient will report no greater than 3/10 left hip pain when transferring out of bed in the morning. (Progressing)       Start:  02/20/25    Expected End:  04/17/25            Patient will report no left buttock and posterior left thigh pain in order to indicate full centralization of symptoms in order to allow for greater tolerance to weight bearing ADLs. (Progressing)       Start:  02/20/25    Expected End:  04/17/25               Short-term goals       Report decreased in pain at worse less than  <   / =  6  /10  to increase tolerance for functional activities.On going (Progressing)       Start:  02/20/25    Expected End:  03/20/25            Pt to improve gross lumbar range of motion by 50% to allow for improved functional mobility to allow for improvement in IADL's (Progressing)       Start:  02/20/25    Expected End:  03/20/25             Increased gross left hip MMT  strength  1/2  to increase tolerance for ADL and work activities. (Progressing)       Start:  02/20/25    Expected End:  03/20/25            Pt to tolerate HEP to improve ROM and independence with ADL's (Progressing)       Start:  02/20/25    Expected End:  03/20/25                Leo Krishnamurthy, PTA

## 2025-03-21 ENCOUNTER — CLINICAL SUPPORT (OUTPATIENT)
Dept: REHABILITATION | Facility: OTHER | Age: 30
End: 2025-03-21
Payer: OTHER GOVERNMENT

## 2025-03-21 DIAGNOSIS — M54.32 SCIATIC PAIN, LEFT: ICD-10-CM

## 2025-03-21 DIAGNOSIS — M25.552 LEFT HIP PAIN: Primary | ICD-10-CM

## 2025-03-21 DIAGNOSIS — Z34.00 SUPERVISION OF NORMAL FIRST PREGNANCY, ANTEPARTUM: ICD-10-CM

## 2025-03-21 PROCEDURE — 97110 THERAPEUTIC EXERCISES: CPT

## 2025-03-21 PROCEDURE — 97112 NEUROMUSCULAR REEDUCATION: CPT

## 2025-03-21 NOTE — PROGRESS NOTES
"  Outpatient Rehab    Physical Therapy Visit    Patient Name: Penelope Velez  MRN: [id number]  YOB: 1995  Encounter Date: 3/21/2025    Therapy Diagnosis:   Encounter Diagnoses   Name Primary?    Left hip pain Yes    Sciatic pain, left     Supervision of normal first pregnancy, antepartum      Physician: Yesenia Orozco MD    Physician Orders: Eval and Treat  Medical Diagnosis: Supervision of normal first pregnancy, antepartum  Left sciatic nerve pain  Left hip pain    Visit # / Visits Authorized:  7 / 12  Date of Evaluation:  2/20/2025   Insurance Authorization Period: 2/20/25 to 11/16/26  Plan of Care Certification:  2/20/2025 to 4/20/25      PT/PTA: PT   Number of PTA visits since last PT visit:0  Time In: 1100   Time Out: 1155  Total Time: 55   Total Billable Time: 55    FOTO:  Intake Score:  %  Survey Score 1:  %  Survey Score 2:  %         Subjective   Patient reports feeling good as she has no pain. Patient reports she hasn't had back pain in two weeks and no difficulty with HEP. She believes she can be discharged next week if she continues to have no pain..  Pain reported as 0/10.      Objective            Treatment:  Therapeutic Exercise  TE 1: Treadmill x 5 min for joint nutrition  TE 2: LTR x 2 minutes 2 second holds  TE 3: Sidelying open books x 10 reps laying on L side  TE 4: supine piriformis stretch 3 x 30 seconds both sides  TE 5: Seated lat pull downs on ball blue TB 2 x 10 3 second holds  TE 6: Supine clams GTB 2 x 10 x 3"  Balance/Neuromuscular Re-Education  NMR 1: Supine TA x 10 reps with legs up on ball in Z lie position  NMR 2: Med X lumbar ext 20# x5; 40# x20  NMR 3: supine adductor squeeze x 10 x 10 second holds  NMR 4: STS with focus on TA activation 2x10 + 10#  NMR 5: Standing hip abd RTB 2 x 10 x 3" ea  NMR 6: BKFO with TA GTB 2 x 10 x 3"  NMR 7: paloff press GTB 2x10  NMR 8: Straight Arm Pulldown BTB with march 2x10    Time Entry(in minutes):  Neuromuscular Re-Education Time " Entry: 30  Therapeutic Exercise Time Entry: 25    Assessment & Plan   Assessment: Patient presents to therapy without adverse symptoms. She appeared to have good tolerance to all therex with no increase in pain or symptoms. Will continue to monitor and progress exercises as tolerated by pt to further address impairments or improve overall functional mobility. Possible discharge at end of next week d/t improvement in mobility and no longer experiences symptoms with patient in agreement.  Evaluation/Treatment Tolerance: Patient tolerated treatment well    Patient will continue to benefit from skilled outpatient physical therapy to address the deficits listed in the problem list box on initial evaluation, provide pt/family education and to maximize pt's level of independence in the home and community environment.     Patient's spiritual, cultural, and educational needs considered and patient agreeable to plan of care and goals.           Plan: continue POC as tolerated by Pt    Goals:   Active       Long-term goals       Report decreased in pain at worse less than  <   / =  5  /10  to increase tolerance for functional activities.On going (Progressing)       Start:  02/20/25    Expected End:  04/17/25             Increased gross left hip MMT  strength 1 to increase tolerance for ADL and work activities. (Progressing)       Start:  02/20/25    Expected End:  04/17/25            Pt to improve global lumbar range of motion by 90% to allow for improved functional mobility to allow for improvement in IADL's.  (Progressing)       Start:  02/20/25    Expected End:  04/17/25            Patient will report no greater than 3/10 left hip pain when transferring out of bed in the morning. (Progressing)       Start:  02/20/25    Expected End:  04/17/25            Patient will report no left buttock and posterior left thigh pain in order to indicate full centralization of symptoms in order to allow for greater tolerance to weight  bearing ADLs. (Progressing)       Start:  02/20/25    Expected End:  04/17/25               Short-term goals       Report decreased in pain at worse less than  <   / =  6  /10  to increase tolerance for functional activities.On going (Progressing)       Start:  02/20/25    Expected End:  03/20/25            Pt to improve gross lumbar range of motion by 50% to allow for improved functional mobility to allow for improvement in IADL's (Progressing)       Start:  02/20/25    Expected End:  03/20/25             Increased gross left hip MMT  strength  1/2  to increase tolerance for ADL and work activities. (Progressing)       Start:  02/20/25    Expected End:  03/20/25            Pt to tolerate HEP to improve ROM and independence with ADL's (Progressing)       Start:  02/20/25    Expected End:  03/20/25                Rose Granda, PT

## 2025-03-26 ENCOUNTER — CLINICAL SUPPORT (OUTPATIENT)
Dept: REHABILITATION | Facility: OTHER | Age: 30
End: 2025-03-26
Payer: OTHER GOVERNMENT

## 2025-03-26 DIAGNOSIS — M25.552 LEFT HIP PAIN: Primary | ICD-10-CM

## 2025-03-26 DIAGNOSIS — Z34.00 SUPERVISION OF NORMAL FIRST PREGNANCY, ANTEPARTUM: ICD-10-CM

## 2025-03-26 DIAGNOSIS — M54.32 SCIATIC PAIN, LEFT: ICD-10-CM

## 2025-03-26 PROCEDURE — 97112 NEUROMUSCULAR REEDUCATION: CPT | Mod: CQ

## 2025-03-26 PROCEDURE — 97530 THERAPEUTIC ACTIVITIES: CPT | Mod: CQ

## 2025-03-26 NOTE — PROGRESS NOTES
"  Outpatient Rehab    Physical Therapy Visit    Patient Name: Penelope Velez  MRN: [id number]  YOB: 1995  Encounter Date: 3/26/2025    Therapy Diagnosis:   Encounter Diagnoses   Name Primary?    Left hip pain Yes    Sciatic pain, left     Supervision of normal first pregnancy, antepartum      Physician: Yesenia Orozco MD    Physician Orders: Eval and Treat  Medical Diagnosis: Supervision of normal first pregnancy, antepartum  Left sciatic nerve pain  Left hip pain    Visit # / Visits Authorized:  9 / 12  Date of Evaluation:  2/20/2025   Insurance Authorization Period: 2/20/25 to 11/16/26  Plan of Care Certification:  2/20/2025 to 4/20/25      PT/PTA: PTA   Number of PTA visits since last PT visit:1  Time In: 0130   Time Out: 0220  Total Time: 50   Total Billable Time: 55    FOTO:  Intake Score:  %  Survey Score 1:  %  Survey Score 2:  %         Subjective   She feels good. She has no pain. Next visit will be her last..  Pain reported as 0/10.      Objective            Treatment:  Therapeutic Exercise  TE 1: Treadmill x 5 min for joint nutrition  TE 2: LTR x 2 minutes 2 second holds  TE 3: Sidelying open books x 10 reps laying on L side  TE 4: supine piriformis stretch 3 x 30 seconds both sides  TE 5: Seated lat pull downs on ball blue TB 2 x 10 3 second holds  TE 6: Supine clams BTB 2 x 10 x 3"  Balance/Neuromuscular Re-Education  NMR 1: Supine TA x 10 reps with legs up on ball in Z lie position  NMR 2: Med X lumbar ext 20# x5; 44# x20  NMR 3: supine adductor squeeze x 10 x 10 second holds  NMR 4: STS with focus on TA activation 2x10 + 10#  NMR 5: Standing hip abd RTB 2 x 10 x 3" ea  NMR 6: BKFO with TA GTB 2 x 10 x 3"  NMR 7: paloff press GTB 2x10  NMR 8: Straight Arm Pulldown BTB with march 2x10  NMR 9: Suitcase carry 20# x 1 lap ea,    Time Entry(in minutes):  Neuromuscular Re-Education Time Entry: 30  Therapeutic Exercise Time Entry: 20    Assessment & Plan   Assessment: Pt continues to present " to therapy without c/o pain. Pt endorses daily HEP compliance. Pt requests NV to be last visit.  Evaluation/Treatment Tolerance: Patient tolerated treatment well    Patient will continue to benefit from skilled outpatient physical therapy to address the deficits listed in the problem list box on initial evaluation, provide pt/family education and to maximize pt's level of independence in the home and community environment.     Patient's spiritual, cultural, and educational needs considered and patient agreeable to plan of care and goals.           Plan: Pt to be DC next visit.    Goals:   Resolved       Long-term goals       Report decreased in pain at worse less than  <   / =  5  /10  to increase tolerance for functional activities.On going (Met)       Start:  02/20/25    Expected End:  04/17/25    Resolved:  03/28/25          Increased gross left hip MMT  strength 1 to increase tolerance for ADL and work activities. (Met)       Start:  02/20/25    Expected End:  04/17/25    Resolved:  03/28/25         Pt to improve global lumbar range of motion by 90% to allow for improved functional mobility to allow for improvement in IADL's.  (Met)       Start:  02/20/25    Expected End:  04/17/25    Resolved:  03/28/25         Patient will report no greater than 3/10 left hip pain when transferring out of bed in the morning. (Met)       Start:  02/20/25    Expected End:  04/17/25    Resolved:  03/28/25         Patient will report no left buttock and posterior left thigh pain in order to indicate full centralization of symptoms in order to allow for greater tolerance to weight bearing ADLs. (Met)       Start:  02/20/25    Expected End:  04/17/25    Resolved:  03/28/25            Short-term goals       Report decreased in pain at worse less than  <   / =  6  /10  to increase tolerance for functional activities.On going (Met)       Start:  02/20/25    Expected End:  03/20/25    Resolved:  03/28/25         Pt to improve gross  lumbar range of motion by 50% to allow for improved functional mobility to allow for improvement in IADL's (Met)       Start:  02/20/25    Expected End:  03/20/25    Resolved:  03/28/25          Increased gross left hip MMT  strength  1/2  to increase tolerance for ADL and work activities. (Met)       Start:  02/20/25    Expected End:  03/20/25    Resolved:  03/28/25         Pt to tolerate HEP to improve ROM and independence with ADL's (Met)       Start:  02/20/25    Expected End:  03/20/25    Resolved:  03/28/25             Kevin Esquivel, PTA

## 2025-03-27 ENCOUNTER — PATIENT MESSAGE (OUTPATIENT)
Dept: OTHER | Facility: OTHER | Age: 30
End: 2025-03-27
Payer: OTHER GOVERNMENT

## 2025-03-28 ENCOUNTER — CLINICAL SUPPORT (OUTPATIENT)
Dept: REHABILITATION | Facility: OTHER | Age: 30
End: 2025-03-28
Payer: OTHER GOVERNMENT

## 2025-03-28 DIAGNOSIS — M25.552 LEFT HIP PAIN: Primary | ICD-10-CM

## 2025-03-28 DIAGNOSIS — Z34.00 SUPERVISION OF NORMAL FIRST PREGNANCY, ANTEPARTUM: ICD-10-CM

## 2025-03-28 DIAGNOSIS — M54.32 SCIATIC PAIN, LEFT: ICD-10-CM

## 2025-03-28 PROCEDURE — 97530 THERAPEUTIC ACTIVITIES: CPT

## 2025-03-28 PROCEDURE — 97110 THERAPEUTIC EXERCISES: CPT

## 2025-03-28 NOTE — PROGRESS NOTES
Outpatient Rehab    Physical Therapy Discharge    Patient Name: Penelope Velez  MRN: [id number]  YOB: 1995  Encounter Date: 3/28/2025    Therapy Diagnosis:   Encounter Diagnoses   Name Primary?    Left hip pain Yes    Sciatic pain, left     Supervision of normal first pregnancy, antepartum      Physician: Yesenia Orozco MD    Physician Orders: Eval and Treat  Medical Diagnosis: Supervision of normal first pregnancy, antepartum  Left sciatic nerve pain  Left hip pain    Visit # / Visits Authorized:  9 / 12  Insurance Authorization Period: 2/20/2025 to 12/31/2025  Date of Evaluation:  2/20/2025   Plan of Care Certification:  2/20/2025 to 4/20/25      PT/PTA:  PT  Number of PTA visits since last PT visit: NA  Time In: 1330   Time Out: 1400  Total Time: 30   Total Billable Time:  30 minutes    FOTO:  Intake Score:  40%  Survey Score 1: 99 %  Survey Score 2:  %         Subjective             Objective            Treatment:  Therapeutic Exercise  TE 1: Treadmill x 5 min for joint nutrition  TE 2: LTR x 2 minutes 2 second holds  TE 3: Sidelying open books x 10 reps laying on L side  TE 4: supine piriformis stretch 3 x 30 seconds both sides  TE 5: Standing rows B TB 2 x 10 5 second holds  TE 6: Sidelying clams 2 x 10 5 second holds G TB  Therapeutic Activity  TA 1: HEP review  TA 2: Marcio pose for hip and back mobility work  TA 3: Assessment/discharge    Time Entry(in minutes):       Assessment & Plan   Assessment:         Patient's spiritual, cultural, and educational needs considered and patient agreeable to plan of care and goals.           Plan:      Goals:   Active       Long-term goals       Report decreased in pain at worse less than  <   / =  5  /10  to increase tolerance for functional activities.On going (Progressing)       Start:  02/20/25    Expected End:  04/17/25             Increased gross left hip MMT  strength 1 to increase tolerance for ADL and work activities. (Progressing)       Start:   02/20/25    Expected End:  04/17/25            Pt to improve global lumbar range of motion by 90% to allow for improved functional mobility to allow for improvement in IADL's.  (Progressing)       Start:  02/20/25    Expected End:  04/17/25            Patient will report no greater than 3/10 left hip pain when transferring out of bed in the morning. (Progressing)       Start:  02/20/25    Expected End:  04/17/25            Patient will report no left buttock and posterior left thigh pain in order to indicate full centralization of symptoms in order to allow for greater tolerance to weight bearing ADLs. (Progressing)       Start:  02/20/25    Expected End:  04/17/25               Short-term goals       Report decreased in pain at worse less than  <   / =  6  /10  to increase tolerance for functional activities.On going (Progressing)       Start:  02/20/25    Expected End:  03/20/25            Pt to improve gross lumbar range of motion by 50% to allow for improved functional mobility to allow for improvement in IADL's (Progressing)       Start:  02/20/25    Expected End:  03/20/25             Increased gross left hip MMT  strength  1/2  to increase tolerance for ADL and work activities. (Progressing)       Start:  02/20/25    Expected End:  03/20/25            Pt to tolerate HEP to improve ROM and independence with ADL's (Progressing)       Start:  02/20/25    Expected End:  03/20/25                Surekha Diaz, PT

## 2025-03-31 NOTE — PROGRESS NOTES
"  Outpatient Rehab    Physical Therapy Visit    Patient Name: Penelope Velez  MRN: [id number]  YOB: 1995  Encounter Date: 3/26/2025    Therapy Diagnosis:   Encounter Diagnoses   Name Primary?    Left hip pain Yes    Sciatic pain, left     Supervision of normal first pregnancy, antepartum      Physician: Yesenia Orozco MD    Physician Orders: Eval and Treat  Medical Diagnosis: Supervision of normal first pregnancy, antepartum  Left sciatic nerve pain  Left hip pain    Visit # / Visits Authorized:  9 / 12  Date of Evaluation:  2/20/2025   Insurance Authorization Period: 2/20/25 to 11/16/26  Plan of Care Certification:  2/20/2025 to 4/20/25      PT/PTA: PTA   Number of PTA visits since last PT visit:1  Time In: 0130   Time Out: 0220  Total Time: 50   Total Billable Time: 55    FOTO:  Intake Score:  %  Survey Score 1:  %  Survey Score 2:  %         Subjective   She feels good. She has no pain. Next visit will be her last..  Pain reported as 0/10.      Objective            Treatment:  Therapeutic Exercise  TE 1: Treadmill x 5 min for joint nutrition  TE 2: LTR x 2 minutes 2 second holds  TE 3: Sidelying open books x 10 reps laying on L side  TE 4: supine piriformis stretch 3 x 30 seconds both sides  TE 5: Seated lat pull downs on ball blue TB 2 x 10 3 second holds  TE 6: Supine clams BTB 2 x 10 x 3"  Balance/Neuromuscular Re-Education  NMR 1: Supine TA x 10 reps with legs up on ball in Z lie position  NMR 2: Med X lumbar ext 20# x5; 44# x20  NMR 3: supine adductor squeeze x 10 x 10 second holds  NMR 4: STS with focus on TA activation 2x10 + 10#  NMR 5: Standing hip abd RTB 2 x 10 x 3" ea  NMR 6: BKFO with TA GTB 2 x 10 x 3"  NMR 7: paloff press GTB 2x10  NMR 8: Straight Arm Pulldown BTB with march 2x10  NMR 9: Suitcase carry 20# x 1 lap ea,    Time Entry(in minutes):  Neuromuscular Re-Education Time Entry: 30  Therapeutic Exercise Time Entry: 20    Assessment & Plan   Assessment: Pt continues to present " to therapy without c/o pain. Pt endorses daily HEP compliance. Pt requests NV to be last visit.  Evaluation/Treatment Tolerance: Patient tolerated treatment well    Patient will continue to benefit from skilled outpatient physical therapy to address the deficits listed in the problem list box on initial evaluation, provide pt/family education and to maximize pt's level of independence in the home and community environment.     Patient's spiritual, cultural, and educational needs considered and patient agreeable to plan of care and goals.           Plan: Pt to be DC next visit.    Goals:   Resolved       Long-term goals       Report decreased in pain at worse less than  <   / =  5  /10  to increase tolerance for functional activities.On going (Met)       Start:  02/20/25    Expected End:  04/17/25    Resolved:  03/28/25          Increased gross left hip MMT  strength 1 to increase tolerance for ADL and work activities. (Met)       Start:  02/20/25    Expected End:  04/17/25    Resolved:  03/28/25         Pt to improve global lumbar range of motion by 90% to allow for improved functional mobility to allow for improvement in IADL's.  (Met)       Start:  02/20/25    Expected End:  04/17/25    Resolved:  03/28/25         Patient will report no greater than 3/10 left hip pain when transferring out of bed in the morning. (Met)       Start:  02/20/25    Expected End:  04/17/25    Resolved:  03/28/25         Patient will report no left buttock and posterior left thigh pain in order to indicate full centralization of symptoms in order to allow for greater tolerance to weight bearing ADLs. (Met)       Start:  02/20/25    Expected End:  04/17/25    Resolved:  03/28/25            Short-term goals       Report decreased in pain at worse less than  <   / =  6  /10  to increase tolerance for functional activities.On going (Met)       Start:  02/20/25    Expected End:  03/20/25    Resolved:  03/28/25         Pt to improve gross  lumbar range of motion by 50% to allow for improved functional mobility to allow for improvement in IADL's (Met)       Start:  02/20/25    Expected End:  03/20/25    Resolved:  03/28/25          Increased gross left hip MMT  strength  1/2  to increase tolerance for ADL and work activities. (Met)       Start:  02/20/25    Expected End:  03/20/25    Resolved:  03/28/25         Pt to tolerate HEP to improve ROM and independence with ADL's (Met)       Start:  02/20/25    Expected End:  03/20/25    Resolved:  03/28/25             Kevin Esquievl, PTA

## 2025-04-02 ENCOUNTER — TELEPHONE (OUTPATIENT)
Dept: OBSTETRICS AND GYNECOLOGY | Facility: CLINIC | Age: 30
End: 2025-04-02
Payer: OTHER GOVERNMENT

## 2025-04-03 ENCOUNTER — HOSPITAL ENCOUNTER (EMERGENCY)
Facility: OTHER | Age: 30
Discharge: HOME | End: 2025-04-03
Attending: OBSTETRICS & GYNECOLOGY
Payer: OTHER GOVERNMENT

## 2025-04-03 VITALS
HEART RATE: 84 BPM | SYSTOLIC BLOOD PRESSURE: 111 MMHG | WEIGHT: 216.06 LBS | TEMPERATURE: 99 F | RESPIRATION RATE: 20 BRPM | HEIGHT: 67 IN | DIASTOLIC BLOOD PRESSURE: 67 MMHG | OXYGEN SATURATION: 94 % | BODY MASS INDEX: 33.91 KG/M2

## 2025-04-03 DIAGNOSIS — R10.2 VAGINAL PAIN: Primary | ICD-10-CM

## 2025-04-03 DIAGNOSIS — O36.8130 DECREASED FETAL MOVEMENTS IN THIRD TRIMESTER, SINGLE OR UNSPECIFIED FETUS: ICD-10-CM

## 2025-04-03 DIAGNOSIS — Z3A.31 31 WEEKS GESTATION OF PREGNANCY: ICD-10-CM

## 2025-04-03 LAB
BILIRUBIN, POC UA: NEGATIVE
BLOOD, POC UA: NEGATIVE
CLARITY, UA: CLEAR
COLOR, UA: YELLOW
GLUCOSE, POC UA: NEGATIVE
KETONES, POC UA: NEGATIVE
LEUKOCYTE EST, POC UA: ABNORMAL
NITRITE, POC UA: NEGATIVE
PH UR STRIP: 7 [PH] (ref 5–8)
PROTEIN, POC UA: NEGATIVE
SPECIFIC GRAVITY, POC UA: 1.02 (ref 1–1.03)
UROBILINOGEN, POC UA: 0.2 E.U./DL

## 2025-04-03 PROCEDURE — 99284 EMERGENCY DEPT VISIT MOD MDM: CPT | Mod: 25

## 2025-04-03 PROCEDURE — 59025 FETAL NON-STRESS TEST: CPT

## 2025-04-03 NOTE — ED PROVIDER NOTES
Encounter Date: 4/3/2025       History     Chief Complaint   Patient presents with    Abdominal Pain     Penelope Velez is a 29 y.o. F at 31w0d presents complaining of abdominal pain.   This IUP is uncomplicated.  Patient denies contractions, denies vaginal bleeding, denies LOF.   Fetal Movement: decreased    Patient reports onset of cramping this morning. She describes it as constant vaginal pressure. She has been having few episodes of diarrhea and has history of hemorrhoids which have exacerbated her pain. She has not taken anything for the pain. She also reports DFM since this morning. She typically feels active movement after breakfast and tried drinking cold/sweet drink with no FM noted.    She denies fevers, chills, abdominal pain, urinary symptoms, or vaginal discharge.  No other complaints at this time.      Review of patient's allergies indicates:  No Known Allergies  Past Medical History:   Diagnosis Date    Chicken pox 7    Hepatitis B Baby    Was a baby. It was treated     No past surgical history on file.  Family History   Problem Relation Name Age of Onset    Breast cancer Neg Hx      Uterine cancer Neg Hx      Cervical cancer Neg Hx       Social History[1]  Review of Systems   Constitutional:  Negative for chills and fever.   Respiratory:  Negative for shortness of breath.    Cardiovascular:  Negative for chest pain.   Gastrointestinal:  Positive for abdominal pain (cramping). Negative for nausea and vomiting.   Neurological:  Negative for light-headedness and headaches.   Psychiatric/Behavioral:  Negative for confusion. The patient is not nervous/anxious.        Physical Exam     Initial Vitals   BP Pulse Resp Temp SpO2   25 1229 25 1228 25 1229 25 1229 25 1230   (!) 150/81 95 20 98.5 °F (36.9 °C) 98 %      MAP       --                Physical Exam    Vitals reviewed.  Constitutional: She appears well-developed and well-nourished.   HENT:   Head: Normocephalic and  atraumatic.   Eyes: Conjunctivae are normal.   Cardiovascular:  Normal rate.           Pulmonary/Chest: No respiratory distress.   Normal work of breathing   Abdominal:   Gravid abdomen     Neurological: She is alert and oriented to person, place, and time.   Skin: Skin is warm and dry.   Psychiatric: She has a normal mood and affect. Her behavior is normal. Thought content normal.     OB LABOR EXAM:       Method: Sterile vaginal exam per MD.   Vaginal Bleeding: none present.     Dilatation: 0.   Station: -5.   Effacement: 40%.   Amniotic Fluid Color: no fluid.           ED Course   Fetal non-stress test    Date/Time: 4/3/2025 12:19 PM    Performed by: Prosper Abraham MD  Authorized by: Prosper Abraham MD    Nonstress Test:     Variability:  6-25 BPM    Decelerations:  None    Accelerations:  10 bpm    Baseline:  130    Contractions:  Not present  Biophysical Profile:     Nonstress Test Interpretation: reactive      Overall Impression:  Reassuring  Post-procedure:     Patient tolerance:  Patient tolerated the procedure well with no immediate complications    Labs Reviewed   POCT URINALYSIS W/O SCOPE - Abnormal       Result Value    Spec Grav UA 1.020      PH, UA 7.0      Protein, UA Negative      Glucose, UA Negative      Ketones, UA Negative      Bilirubin, UA Negative      Blood, UA Negative      Leukocytes, UA Trace (*)     Nitrite, UA Negative      Urobilinogen, UA 0.2      Color, UA POC Yellow      Clarity, UA, POC Clear            Imaging Results    None     BSUS performed showing cephalic presentation, MVP >2cm, and active FM.        Medications - No data to display  Medical Decision Making  Abdominal Pain  - SVE: cl/th/hi  - No contractions on tocometer  - Patient is not in labor     Decreased fetal movement  - NST reactive and reassuring   - FHT: reassuring, 130bpm,  BTB variability, +accels, -decels  - RTC for prenatal appointment as scheduled  - RT [facility] for onset of labor: increasing ctx, ROM,  decreased movement      - Patient stable for discharge at this time  - ED return precautions given  - She voiced understanding and is in agreement with the plan    Of note, patient had /81 on admission to OB ED and was anxious during this time. No hypertensive disorders int his pregnancy. Asymptomatic. Repeat BPs thereafter were normotensive.     Prosper Abraham MD PGY2  Obstetrics and Gynecology                Attending Attestation:   Physician Attestation Statement for Resident:  As the supervising MD   Physician Attestation Statement: I have personally seen and examined this patient.   I agree with the above history.  -:   As the supervising MD I agree with the above PE.     As the supervising MD I agree with the above treatment, course, plan, and disposition.   I was personally present during the critical portions of the procedure(s) performed by the resident and was immediately available in the ED to provide services and assistance as needed during the entire procedure.  I have reviewed and agree with the residents interpretation of the following: lab data.  I have reviewed the following: old records at this facility.            Attending ED Notes:   I saw and examined the patient myself along with the resident. I have personally reviewed pertinent prior records, labs, imaging, and procedures. I have reviewed the documentation and agree with the findings and plan as documented.      at 31w0d presenting with abdominal cramping and decreased fetal movement. NST reactive and reassuring, tocometry without contractions. Cervix closed. UA unremarkable. Bedside US with normal gross fetal movement and normal MVP. Discharge home in stable condition. Return precautions discussed.      Diana You MD FACOG  OB Hospitalist                                 Clinical Impression:  Final diagnoses:  [R10.2] Vaginal pain (Primary)  [O36.8130] Decreased fetal movements in third trimester, single or unspecified  fetus  [Z3A.31] 31 weeks gestation of pregnancy          ED Disposition Condition    Discharge Stable          ED Prescriptions    None       Follow-up Information       Follow up With Specialties Details Why Contact Info    [facility] - Emergency Dept (Obstetrics) Emergency Medicine  If symptoms worsen [address] [facility] [id number] [phone]    Yesenia Orozco MD Obstetrics and Gynecology  As needed, As scheduled [address] [facility] [address]  [phone]                   [1]   Social History  Tobacco Use    Smoking status: Never    Smokeless tobacco: Never   Substance Use Topics    Alcohol use: Never    Drug use: Never        Diana You MD  04/03/25 2024

## 2025-04-10 ENCOUNTER — PATIENT MESSAGE (OUTPATIENT)
Dept: OTHER | Facility: OTHER | Age: 30
End: 2025-04-10
Payer: OTHER GOVERNMENT

## 2025-04-17 ENCOUNTER — ROUTINE PRENATAL (OUTPATIENT)
Dept: OBSTETRICS AND GYNECOLOGY | Facility: CLINIC | Age: 30
End: 2025-04-17
Payer: OTHER GOVERNMENT

## 2025-04-17 VITALS
SYSTOLIC BLOOD PRESSURE: 123 MMHG | DIASTOLIC BLOOD PRESSURE: 81 MMHG | BODY MASS INDEX: 34.29 KG/M2 | WEIGHT: 218.94 LBS

## 2025-04-17 DIAGNOSIS — R06.02 SHORTNESS OF BREATH DURING PREGNANCY: ICD-10-CM

## 2025-04-17 DIAGNOSIS — O99.891 SHORTNESS OF BREATH DURING PREGNANCY: ICD-10-CM

## 2025-04-17 DIAGNOSIS — Z34.00 SUPERVISION OF NORMAL FIRST PREGNANCY, ANTEPARTUM: Primary | ICD-10-CM

## 2025-04-17 DIAGNOSIS — Z23 NEED FOR TDAP VACCINATION: ICD-10-CM

## 2025-04-17 DIAGNOSIS — Z29.11 NEED FOR RSV VACCINATION: Primary | ICD-10-CM

## 2025-04-17 PROCEDURE — 90715 TDAP VACCINE 7 YRS/> IM: CPT | Mod: PBBFAC

## 2025-04-17 PROCEDURE — 99999PBSHW PR PBB SHADOW TECHNICAL ONLY FILED TO HB: Mod: PBBFAC,,,

## 2025-04-17 PROCEDURE — 99999 PR PBB SHADOW E&M-EST. PATIENT-LVL III: CPT | Mod: PBBFAC,,, | Performed by: OBSTETRICS & GYNECOLOGY

## 2025-04-17 PROCEDURE — 99999 PR PBB SHADOW E&M-EST. PATIENT-LVL I: CPT | Mod: PBBFAC,,,

## 2025-04-17 PROCEDURE — 90471 IMMUNIZATION ADMIN: CPT | Mod: PBBFAC

## 2025-04-17 PROCEDURE — 90678 RSV VACC PREF BIVALENT IM: CPT | Mod: PBBFAC

## 2025-04-17 PROCEDURE — 90472 IMMUNIZATION ADMIN EACH ADD: CPT | Mod: PBBFAC

## 2025-04-17 PROCEDURE — 99213 OFFICE O/P EST LOW 20 MIN: CPT | Mod: PBBFAC | Performed by: OBSTETRICS & GYNECOLOGY

## 2025-04-17 RX ADMIN — TETANUS TOXOID, REDUCED DIPHTHERIA TOXOID AND ACELLULAR PERTUSSIS VACCINE, ADSORBED 0.5 ML: 5; 2.5; 8; 8; 2.5 SUSPENSION INTRAMUSCULAR at 01:04

## 2025-04-17 RX ADMIN — Medication 120 MCG: at 02:04

## 2025-04-17 NOTE — PROGRESS NOTES
Here for Tdap injection. Patient without complaint of pain at this time, injection given. Tolerated well no pain noted post injection advised to wait in lobby 15 minutes and report any adverse reactions.     Site:rd       Patient here for abrysvo injection. No pain noted, injection given. Patient tolerated well advised to wait in lobby 5 minutes and report any adverse reactions.       Site: ld

## 2025-04-17 NOTE — PROGRESS NOTES
28yo  at 33w0d.   Overall doing well today.  Still has occasional episodes of SOB and dizziness, HR can vary from 40s to 140s on apple watch - has appt with cardiology next week.  Constipation improved with colace.  Hemorrhoids stable.  No ctx/vb/lof.  +FM.   No other complaints today.  Taking PNV.      A/P:   Routine OB visit today.   F/u with cardiology as scheduled, ED precautions given.     Continue daily colace. Encourage PO hydration.  tdap and RSV today.   OB ED precautions given.   Counseling done, precautions given, all questions answered.  RTC 2 wks for OB visit and GBS.     Yesenia Orozco MD

## 2025-04-19 ENCOUNTER — HOSPITAL ENCOUNTER (EMERGENCY)
Facility: OTHER | Age: 30
Discharge: HOME | End: 2025-04-19
Attending: OBSTETRICS & GYNECOLOGY
Payer: OTHER GOVERNMENT

## 2025-04-19 VITALS
SYSTOLIC BLOOD PRESSURE: 127 MMHG | OXYGEN SATURATION: 95 % | RESPIRATION RATE: 18 BRPM | DIASTOLIC BLOOD PRESSURE: 76 MMHG | TEMPERATURE: 98 F | HEART RATE: 91 BPM

## 2025-04-19 DIAGNOSIS — Z3A.33 33 WEEKS GESTATION OF PREGNANCY: ICD-10-CM

## 2025-04-19 DIAGNOSIS — K64.4 BLEEDING EXTERNAL HEMORRHOIDS: Primary | ICD-10-CM

## 2025-04-19 PROCEDURE — 59025 FETAL NON-STRESS TEST: CPT

## 2025-04-19 PROCEDURE — 99284 EMERGENCY DEPT VISIT MOD MDM: CPT | Mod: 25

## 2025-04-19 RX ORDER — PRAMOXINE HYDROCHLORIDE HYDROCORTISONE ACETATE 100; 100 MG/10G; MG/10G
1 AEROSOL, FOAM TOPICAL 3 TIMES DAILY
Qty: 90 APPLICATOR | Refills: 0 | Status: SHIPPED | OUTPATIENT
Start: 2025-04-19

## 2025-04-20 NOTE — DISCHARGE INSTRUCTIONS
Call clinic [phone] or L & D after hours at [phone] for vaginal bleeding, leakage of fluids, contractions 4-5 in one hour, decreased fetal movements ( 10 kicks in 2 hours), headache not relieved by Tylenol, blurry vision, or temp of 100.4 or greater.  Begin doing fetal kick counts, at least 10 movements in 2 hours starting at 28 weeks gestation.  Keep next clinic appointment.

## 2025-04-20 NOTE — ED PROVIDER NOTES
Encounter Date: 2025       History     Chief Complaint   Patient presents with    Vaginal Bleeding     Penelope Velez is a 29 y.o. F at 33w2d presents complaining of vaginal spotting. She found a quarter size clot in her underwear after standing. She does alos have hemorrhoids so is unsure where the bleeding is coming from.  She has been constipated but this has been improved since daily Miralax,  This IUP is complicated by obesity.  Patient denies contractions, reports vaginal bleeding, denies LOF.   Fetal Movement: normal        Review of patient's allergies indicates:  No Known Allergies  Past Medical History:   Diagnosis Date    Chicken pox 7    Hepatitis B Baby    Was a baby. It was treated     No past surgical history on file.  Family History   Problem Relation Name Age of Onset    Breast cancer Neg Hx      Uterine cancer Neg Hx      Cervical cancer Neg Hx       Social History[1]  Review of Systems   Constitutional:  Negative for chills and fever.   HENT:  Negative for congestion.    Eyes:  Negative for visual disturbance.   Respiratory:  Negative for shortness of breath.    Cardiovascular:  Negative for chest pain and palpitations.   Gastrointestinal:  Positive for rectal pain. Negative for constipation, diarrhea, nausea and vomiting.   Genitourinary:  Positive for vaginal bleeding. Negative for dysuria and vaginal discharge.   Musculoskeletal:  Negative for back pain.   Skin:  Negative for rash.   Neurological:  Negative for light-headedness and headaches.       Physical Exam     Initial Vitals [25 1940]   BP Pulse Resp Temp SpO2   127/76 91 18 97.9 °F (36.6 °C) 95 %      MAP       --       Temp:  [97.9 °F (36.6 °C)] 97.9 °F (36.6 °C)  Pulse:  [91] 91  Resp:  [18] 18  SpO2:  [95 %] 95 %  BP: (127)/(76) 127/76   Physical Exam    Vitals reviewed.  Constitutional: She appears well-developed and well-nourished. She is not diaphoretic.   HENT:   Head: Normocephalic and atraumatic.   Eyes: Conjunctivae  are normal.   Cardiovascular:  Normal rate.           Abdominal: There is no abdominal tenderness. There is no rebound and no guarding.     Neurological: She is alert and oriented to person, place, and time.   Skin: Skin is warm and dry.   Psychiatric: She has a normal mood and affect.     OB LABOR EXAM:       Method: Sterile speculum exam per MD.   Vaginal Bleeding: none present.               Comments: Blood present around anus and on LYNDA. External hemorrhoids noted. No internal palpated.        ED Course   Fetal non-stress test    Date/Time: 4/19/2025 7:49 PM    Performed by: Jie Ruffin MD  Authorized by: Jie Ruffin MD    Nonstress Test:     Variability:  6-25 BPM    Decelerations:  None    Accelerations:  15 bpm    Baseline:  150    Contractions:  Not present  Biophysical Profile:     Nonstress Test Interpretation: reactive    Post-procedure:     Patient tolerance:  Patient tolerated the procedure well with no immediate complications    Labs Reviewed - No data to display       Imaging Results    None          Medications - No data to display  Medical Decision Making  - VSS  - NST RR  - [place] no contractions  - No blood on SSE  - Blood around anus and on LYNDA. Internal hemorrhoids present, no internal hemorrhoids palpated.  - Discussed with patient symptomatic treatment of hemorrhoids.   - Patient has been using Preparation H with no improvement  - Rx for Practoform send to pharmacy   - Patient stable for discharge at this time  - ED return precautions given  - She voiced understanding and is in agreement with the plan      Jie Ruffin MD  Obstetrics and Gynecology, PGY-2      Risk  Prescription drug management.              Attending Attestation:   Physician Attestation Statement for Resident:  As the supervising MD   Physician Attestation Statement: I have personally seen and examined this patient.   I agree with the above history.  -:   As the supervising MD I agree with the above PE.     As  the supervising MD I agree with the above treatment, course, plan, and disposition.   -:     See ED course.  External hemorrhoids without complication.  NST reactive and reassuring, toco with no contractions.  Agree with discharge to home.    Ary Smith MD,ADRIEN  Date and Time: 04/20/2025 1:33 AM  OB Hospitalist    I was personally present during the critical portions of the procedure(s) performed by the resident and was immediately available in the ED to provide services and assistance as needed during the entire procedure.   I have reviewed the following: old records at this facility.                ED Course as of 04/19/25 1951   Sat Apr 19, 2025 1946 Patient is a 29y.o. G1 at 33 2/7 weeks presents to the [facility] for vaginal bleeding today.  She also reports pain due to hemorrhoids. External hemorrhoids noted on exam with a small amount of blood surrounding the anus, no internal hemorrhoids noted.  No blood noted in the vaginal vault.    Plan:  - Proctofoam sent to the pharmacy and hemorrhoid care discussed.  - NST reactive and reassuring, toco with no contractions.  - Agree with discharge to home, continue miralax.  - Patient given indications for return to [facility]. [CD]      ED Course User Index  [CD] Ary Smith MD                           Clinical Impression:  Final diagnoses:  [K64.4] Bleeding external hemorrhoids (Primary)  [Z3A.33] 33 weeks gestation of pregnancy                   Jie Seth MD  Resident  04/19/25 1952         [1]   Social History  Tobacco Use    Smoking status: Never    Smokeless tobacco: Never   Substance Use Topics    Alcohol use: Never    Drug use: Never        Ary Smith MD  04/20/25 0135

## 2025-04-22 ENCOUNTER — OFFICE VISIT (OUTPATIENT)
Dept: CARDIOLOGY | Facility: CLINIC | Age: 30
End: 2025-04-22
Payer: OTHER GOVERNMENT

## 2025-04-22 ENCOUNTER — LAB VISIT (OUTPATIENT)
Dept: LAB | Facility: OTHER | Age: 30
End: 2025-04-22
Attending: INTERNAL MEDICINE
Payer: OTHER GOVERNMENT

## 2025-04-22 VITALS
BODY MASS INDEX: 34.96 KG/M2 | DIASTOLIC BLOOD PRESSURE: 84 MMHG | SYSTOLIC BLOOD PRESSURE: 122 MMHG | HEART RATE: 84 BPM | OXYGEN SATURATION: 99 % | WEIGHT: 223.19 LBS

## 2025-04-22 DIAGNOSIS — O99.891 SHORTNESS OF BREATH DURING PREGNANCY: ICD-10-CM

## 2025-04-22 DIAGNOSIS — R00.2 PALPITATIONS: Primary | ICD-10-CM

## 2025-04-22 DIAGNOSIS — R00.2 PALPITATIONS: ICD-10-CM

## 2025-04-22 DIAGNOSIS — R06.02 SHORTNESS OF BREATH DURING PREGNANCY: ICD-10-CM

## 2025-04-22 LAB — TSH SERPL-ACNC: 1.03 UIU/ML (ref 0.4–4)

## 2025-04-22 PROCEDURE — 99204 OFFICE O/P NEW MOD 45 MIN: CPT | Mod: S$PBB,25,, | Performed by: INTERNAL MEDICINE

## 2025-04-22 PROCEDURE — 99999 PR PBB SHADOW E&M-EST. PATIENT-LVL III: CPT | Mod: PBBFAC,,, | Performed by: INTERNAL MEDICINE

## 2025-04-22 PROCEDURE — 36415 COLL VENOUS BLD VENIPUNCTURE: CPT

## 2025-04-22 PROCEDURE — 84443 ASSAY THYROID STIM HORMONE: CPT

## 2025-04-22 PROCEDURE — 99213 OFFICE O/P EST LOW 20 MIN: CPT | Mod: PBBFAC | Performed by: INTERNAL MEDICINE

## 2025-04-22 PROCEDURE — 93010 ELECTROCARDIOGRAM REPORT: CPT | Mod: ,,, | Performed by: INTERNAL MEDICINE

## 2025-04-22 PROCEDURE — 93005 ELECTROCARDIOGRAM TRACING: CPT

## 2025-04-22 NOTE — PROGRESS NOTES
[facility]    Chief Complaint  Chief Complaint   Patient presents with    Shortness of Breath    Palpitations       HPI:    Patient is referred for evaluation of shortness of breath.  She is about 34 weeks pregnant.  For the past couple of months, she will have episodes where she feels like her heart is racing and she is short of breath.  Gets better if she stands up and walks around.  Her Apple watch may read a heart rate as high as 140.  No syncope.  Mostly occurs at rest.  She remains active through pregnancy.  She walks without limitations.  No paroxysmal nocturnal dyspnea or orthopnea.  Reports that she was sick to the 1st few months of pregnancy and only over the past couple of months has been able to eat regularly.  She has gained a significant amount of weight over these past couple of months.  She wonders if that is contributing as well.  No prior cardiac problems or workup.  Reviewed an Apple watch tracing from 1 of these episodes.  A lot of artifact but appears to be sinus rhythm with a rate around 80.    Medications  Current Medications[1]     History  Past Medical History:   Diagnosis Date    Chicken pox 6    Hepatitis B Baby    Was a baby. It was treated     History reviewed. No pertinent surgical history.  Social History[2]  Family History   Problem Relation Name Age of Onset    No Known Problems Mother      No Known Problems Father      No Known Problems Sister      No Known Problems Brother      No Known Problems Maternal Aunt      No Known Problems Maternal Uncle      No Known Problems Paternal Aunt      No Known Problems Paternal Uncle      No Known Problems Maternal Grandmother      No Known Problems Maternal Grandfather      No Known Problems Paternal Grandmother      No Known Problems Paternal Grandfather      No Known Problems Other      Breast cancer Neg Hx      Uterine cancer Neg Hx      Cervical cancer Neg Hx          Allergies  Review of patient's allergies indicates:  No  Known Allergies    Review of Systems   Review of Systems   Constitutional: Positive for weight gain. Negative for malaise/fatigue and weight loss.   Eyes:  Negative for visual disturbance.   Cardiovascular:  Positive for palpitations. Negative for chest pain, claudication, cyanosis, dyspnea on exertion, irregular heartbeat, leg swelling, near-syncope, orthopnea, paroxysmal nocturnal dyspnea and syncope.   Respiratory:  Positive for shortness of breath. Negative for cough, hemoptysis, sleep disturbances due to breathing and wheezing.    Hematologic/Lymphatic: Negative for bleeding problem. Does not bruise/bleed easily.   Skin:  Negative for poor wound healing.   Musculoskeletal:  Negative for muscle cramps and myalgias.   Gastrointestinal:  Negative for abdominal pain, anorexia, diarrhea, heartburn, hematemesis, hematochezia, melena, nausea and vomiting.   Genitourinary:  Negative for hematuria and nocturia.   Neurological:  Negative for excessive daytime sleepiness, dizziness, focal weakness, light-headedness and weakness.       Physical Exam  Vitals:    04/22/25 1255   BP: 122/84   Pulse: 84     Wt Readings from Last 1 Encounters:   04/22/25 101.2 kg (223 lb 3.2 oz)     Physical Exam  Vitals and nursing note reviewed.   Constitutional:       General: She is not in acute distress.     Appearance: She is not toxic-appearing or diaphoretic.   HENT:      Head: Normocephalic and atraumatic.      Mouth/Throat:      Lips: Pink.      Mouth: Mucous membranes are moist.   Eyes:      General: No scleral icterus.     Conjunctiva/sclera: Conjunctivae normal.   Neck:      Thyroid: No thyromegaly.      Vascular: No carotid bruit, hepatojugular reflux or JVD.      Trachea: Trachea normal.   Cardiovascular:      Rate and Rhythm: Normal rate and regular rhythm. No extrasystoles are present.     Chest Wall: PMI is not displaced.      Pulses:           Carotid pulses are 2+ on the right side and 2+ on the left side.       Radial  pulses are 2+ on the right side and 2+ on the left side.        Dorsalis pedis pulses are 2+ on the right side and 2+ on the left side.        Posterior tibial pulses are 2+ on the right side and 2+ on the left side.      Heart sounds: S1 normal and S2 normal. No murmur heard.     No friction rub. No S3 or S4 sounds.   Pulmonary:      Effort: Pulmonary effort is normal. No accessory muscle usage or respiratory distress.      Breath sounds: Normal breath sounds and air entry. No decreased breath sounds, wheezing, rhonchi or rales.   Abdominal:      General: Bowel sounds are normal.      Comments: gravid   Musculoskeletal:         General: No tenderness or deformity.      Right lower leg: No edema.      Left lower leg: No edema.   Skin:     General: Skin is warm and dry.      Capillary Refill: Capillary refill takes less than 2 seconds.      Coloration: Skin is not cyanotic or pale.      Nails: There is no clubbing.   Neurological:      General: No focal deficit present.      Mental Status: She is alert and oriented to person, place, and time.   Psychiatric:         Attention and Perception: Attention normal.         Mood and Affect: Mood normal.         Speech: Speech normal.         Behavior: Behavior normal. Behavior is cooperative.         Labs  Admission on 04/03/2025, Discharged on 04/03/2025   Component Date Value Ref Range Status    Spec Grav UA 04/03/2025 1.020  1.005 - 1.030 Final    PH, UA 04/03/2025 7.0  5.0 - 8.0 Final    Protein, UA 04/03/2025 Negative  Negative Final    Glucose, UA 04/03/2025 Negative  Negative Final    Ketones, UA 04/03/2025 Negative  Negative Final    Bilirubin, UA 04/03/2025 Negative  Negative Final    Blood, UA 04/03/2025 Negative  Negative Final    Leukocytes, UA 04/03/2025 Trace (A)  Negative Final    Nitrite, UA 04/03/2025 Negative  Negative Final    Urobilinogen, UA 04/03/2025 0.2  <=1.0 E.U./dL Final    Color, UA POC 04/03/2025 Yellow  Yellow,Straw,Ember Final    Clarity, UA,  POC 04/03/2025 Clear  Clear Final   Lab Visit on 03/14/2025   Component Date Value Ref Range Status    WBC 03/14/2025 13.10 (H)  3.90 - 12.70 K/uL Final    RBC 03/14/2025 4.30  4.00 - 5.40 M/uL Final    Hemoglobin 03/14/2025 12.8  12.0 - 16.0 g/dL Final    Hematocrit 03/14/2025 38.7  37.0 - 48.5 % Final    MCV 03/14/2025 90  82 - 98 fL Final    MCH 03/14/2025 29.8  27.0 - 31.0 pg Final    MCHC 03/14/2025 33.1  32.0 - 36.0 g/dL Final    RDW 03/14/2025 12.8  11.5 - 14.5 % Final    Platelets 03/14/2025 163  150 - 450 K/uL Final    MPV 03/14/2025 11.3  9.2 - 12.9 fL Final    Immature Granulocytes 03/14/2025 0.9 (H)  0.0 - 0.5 % Final    Gran # (ANC) 03/14/2025 10.2 (H)  1.8 - 7.7 K/uL Final    Immature Grans (Abs) 03/14/2025 0.12 (H)  0.00 - 0.04 K/uL Final    Comment: Mild elevation in immature granulocytes is non specific and   can be seen in a variety of conditions including stress response,   acute inflammation, trauma and pregnancy. Correlation with other   laboratory and clinical findings is essential.      Lymph # 03/14/2025 1.9  1.0 - 4.8 K/uL Final    Mono # 03/14/2025 0.7  0.3 - 1.0 K/uL Final    Eos # 03/14/2025 0.1  0.0 - 0.5 K/uL Final    Baso # 03/14/2025 0.04  0.00 - 0.20 K/uL Final    nRBC 03/14/2025 0  0 /100 WBC Final    Gran % 03/14/2025 77.9 (H)  38.0 - 73.0 % Final    Lymph % 03/14/2025 14.8 (L)  18.0 - 48.0 % Final    Mono % 03/14/2025 5.4  4.0 - 15.0 % Final    Eosinophil % 03/14/2025 0.7  0.0 - 8.0 % Final    Basophil % 03/14/2025 0.3  0.0 - 1.9 % Final    Differential Method 03/14/2025 Automated   Final   Routine Prenatal on 03/14/2025   Component Date Value Ref Range Status    Bacterial vaginosis DNA 03/14/2025 Not Detected  Not Detected Final    Candida sp 03/14/2025 Not Detected  Negative Final    Comment: Lianna species group includes: Candida albicans, Candida tropicalis,  Candida parapsiolosis, Lianna dubliniensis      Lianna glabrata/krusei 03/14/2025 Not Detected  Not Detected Final     Trichomonas vaginalis 03/14/2025 Not Detected  Not Detected Final   Lab Visit on 02/14/2025   Component Date Value Ref Range Status    OB Glucose Screen 02/14/2025 84  70 - 140 mg/dL Final   Lab Visit on 01/17/2025   Component Date Value Ref Range Status    Gestational Age 01/17/2025 Enriquez   Final    Fetal Fraction 01/17/2025 18%   Final    GESTATIONAL AGE > 9 01/17/2025 Yes   Final    Result 01/17/2025 Negative   Final     Comments 01/17/2025 Comment   Final    Comment: This specimen showed an expected representation of  chromosome 21, 18 and 13 material. Clinical correlation is  suggested.      Approved By 01/17/2025 Comment   Final    Richard Gilmore MD, PhD, Director, [facility]    Trisomy 21 (T21) 01/17/2025 Negative   Final    Trisomy 18 01/17/2025 Negative   Final    Trisomy 13 (T13) 01/17/2025 Negative   Final    FETAL SEX RESULT 01/17/2025 Comment   Final    Consistent with Male    MONOSOMY X RESULT 01/17/2025 Not Detected   Final    XYY (Frank Syndrome) 01/17/2025 Not Detected   Final    XXY (Klinefelter Syndrome) 01/17/2025 Not Detected   Final    XXX (Triple X Syndrome) 01/17/2025 Not Detected   Final    Negative Predictive Value 01/17/2025 Note   Final    Comment: The Negative Predictive Value (NPV) for trisomy 21, 18, and  13 is greater than 99%. The NPV for SCA and ESS cannot be  calculated as SCA and ESS are only reported when an  abnormality is detected.      Positive Predictive Value 01/17/2025 N/A   Final    About the test 01/17/2025 Comment   Final    Comment: The MaterniT(R) 21 PLUS laboratory-developed test (LDT)  analyzes circulating cell-free DNA from a maternal blood  sample. This test is used for screening purposes and not  diagnostic. Clinical correlation is recommended. Validation  data on twin pregnancies is limited and the ability of this  test to detect aneuploidy in higher multiple gestations has  not yet been validated.      Test Methodology:  01/17/2025 Comment   Final    Comment: See Notes  Circulating cell-free DNA was purified from the plasma  component of maternal blood. The extracted DNA was then  converted into a genomic DNA library for aneuploidy  analysis of chromosomes 21, 18, and 13 via next generation  sequencing.[1] Optional findings based on the test order  include sex chromosome aneuploidy (SCA)[2], and enhanced  sequencing series (ESS)[3], which will only be reported on  as an additional finding when an abnormality is detected.  SCA testing includes information on X and Y representation,  while ESS testing includes deletions in selected regions  (22q, 15q, 11q, 8q, 5p, 4p, 1p) and trisomy of chromosomes  16 and 22.      Performance 01/17/2025 Comment   Final    Comment: The performance characteristics of the MaterniT(R) 21 PLUS  laboratory-developed test (LDT) have been determined in a  clinical validation study with pregnant women at increased  risk for fetal chromosomal aneuploidy.[1-4]      Performance Characteristics 01/17/2025 Note   Final    Comment: -----------------------------------------------------------  ! Fetal Sex                      ! Accuracy: 99.4%        !  !---------------------------------------------------------!  ! Region (associated syndrome)   ! Est. Sens# ! Est. Spec !  !---------------------------------------------------------!  ! Trisomy 21 (Down Syndrome)     ! 99.1%      ! 99.9%     !  !---------------------------------------------------------!  ! Trisomy 18 (Porter Syndrome)  ! >99.9%     ! 99.6%     !  !---------------------------------------------------------!  ! Trisomy 13 (Patau Syndrome)    ! 91.7%      ! 99.7%     !  !---------------------------------------------------------!  ! Sex Chromosome Aneuploidies##  ! 96.2%      ! 99.7%     !  !---------------------------------------------------------!  * As reported in [facility] database nstd37  [https://www.ncbi.nlm.nih.gov/dbvar/studies/nstd37/ ]  # Estimated  Sensitivity. Sensitivity estimated across the  observed size distribution of each syndrome [per ISCA                             database nstd37] and across the range of fetal fractions  observed in routine clinical NIPT. Actual sensitivity can  also be influenced by other factors such as the size of the  event, total sequence counts, amplification bias, or  sequence bias.  ## Enriquez gestation only.      Limitations: 01/17/2025 Comment   Final    Comment: While the results of these tests are highly reliable,  discordant results, including inaccurate fetal sex  prediction, may occur due to placental, maternal, or fetal  mosaicism or neoplasm; vanishing twin; prior maternal organ  transplant; or other causes. These tests are screening  tests and not diagnostic; they do not replace the accuracy  and precision of prenatal diagnosis with CVS or  amniocentesis. A patient with a positive test result should  be referred for genetic counseling and offered invasive  prenatal diagnosis for confirmation of test results.[5] The  results of this testing, including the benefits and  limitations, should be discussed with a qualified  healthcare provider. Pregnancy management decisions,  including termination of the pregnancy, should not be based  on the results of these tests alone. The healthcare  provider is responsible for the use of this information in  the management of their patient.  Sex chromosomal  aneuploidies are not reportable for known mul                           tiple  gestations. A negative result does not ensure an unaffected  pregnancy nor does it exclude the possibility of other  chromosomal abnormalities or birth defects which are not a  part of these tests. An uninformative result may be  reported, the causes of which may include, but are not  limited to, insufficient sequencing coverage, noise or  artifacts in the region, amplification or sequencing bias,  or insufficient fetal fraction. These tests are  not  intended to identify pregnancies at risk for neural tube  defects or ventral wall defects. Testing for whole  chromosome abnormalities (including sex chromosomes) and  for subchromosomal abnormalities could lead to the  potential discovery of both fetal and maternal genomic  abnormalities that could have major, minor, or no, clinical  significance. Evaluating the significance of a positive or  a non-reportable result may involve both invasive testing  and additional studies on the mother. Such investigations  may lead to a diagnosis                            of maternal chromosomal or  subchromosomal abnormalities, which on occasion may be  associated with benign or malignant maternal neoplasms.  These tests may not accurately identify fetal triploidy,  balanced rearrangements, or the precise location of  subchromosomal duplications or deletions; these may be  detected by prenatal diagnosis with CVS or amniocentesis.  The ability to report results may be impacted by maternal  BMI, maternal weight, maternal systemic lupus erythematosus  (SLE) and/or by certain pharmaceutical agents such as low  molecular weight heparin (for example: Lovenox(R),  Xaparin(R), Clexane(R) and Fragmin(R)).      Note: 01/17/2025 Comment   Final    Comment: See Notes  [facility] is a subsidiary of [employer], using the brand [facility]. This test was  developed and its performance characteristics determined by  [facility]. It has not been cleared or approved by the Food  and Drug Administration. This laboratory is certified under  the Clinical Laboratory Improvement Amendments (CLIA) as  qualified to perform high complexity clinical laboratory  testing and accredited by the College of American  Pathologists (CAP).  If there is future clinical need for adding MaterniT GENOME  testing, this specimen will be available until term.  [place] samples will not be retained beyond 60 days.  [place]  patients will have to send a new sample for  re-sequencing (LCA Test Code: 410290).      Service comment 04 01/17/2025 Comment   Final    Comment: 1. Evelyn PAVON, et al. [place] Med. 2012;14(3):296-305.  2. Terence FLORES et al. Prenat Diag. 2013;33(6):591-597.  3. Ivan C, et al. Clin Chem. 2015 Apr;61(4):608-616.  4. Evelyn PAVON, et al. [place] Med. 2011;13(11):913-920.  5. ACOG/SMFM Practice Bulletin No. 226, Oct 2020.  Performed at:  [facility]  [address] [place] [address]  [id number]  : Richard Olson, Phone:  [phone]     Initial Prenatal on 01/17/2025   Component Date Value Ref Range Status    Urine Culture, Routine 01/17/2025 Multiple organisms isolated. None in predominance.  Repeat if   Final    Urine Culture, Routine 01/17/2025 clinically necessary.   Final   Lab Visit on 12/10/2024   Component Date Value Ref Range Status    Sodium 12/10/2024 137  136 - 145 mmol/L Final    Potassium 12/10/2024 3.5  3.5 - 5.1 mmol/L Final    Specimen slightly hemolyzed    Chloride 12/10/2024 106  95 - 110 mmol/L Final    CO2 12/10/2024 18 (L)  23 - 29 mmol/L Final    Glucose 12/10/2024 108  70 - 110 mg/dL Final    BUN 12/10/2024 4 (L)  6 - 20 mg/dL Final    Creatinine 12/10/2024 0.7  0.5 - 1.4 mg/dL Final    Calcium 12/10/2024 9.4  8.7 - 10.5 mg/dL Final    Total Protein 12/10/2024 7.2  6.0 - 8.4 g/dL Final    Albumin 12/10/2024 3.5  3.5 - 5.2 g/dL Final    Total Bilirubin 12/10/2024 0.2  0.1 - 1.0 mg/dL Final    Comment: For infants and newborns, interpretation of results should be based  on gestational age, weight and in agreement with clinical  observations.    Premature Infant recommended reference ranges:  Up to 24 hours.............<8.0 mg/dL  Up to 48 hours............<12.0 mg/dL  3-5 days..................<15.0 mg/dL  6-29 days.................<15.0 mg/dL      Alkaline Phosphatase 12/10/2024 58  40 - 150 U/L Final    AST 12/10/2024 16  10 - 40 U/L Final    ALT  12/10/2024 11  10 - 44 U/L Final    eGFR 12/10/2024 >60  >60 mL/min/1.73 m^2 Final    Anion Gap 12/10/2024 13  8 - 16 mmol/L Final    WBC 12/10/2024 9.50  3.90 - 12.70 K/uL Final    RBC 12/10/2024 4.68  4.00 - 5.40 M/uL Final    Hemoglobin 12/10/2024 13.7  12.0 - 16.0 g/dL Final    Hematocrit 12/10/2024 41.4  37.0 - 48.5 % Final    MCV 12/10/2024 89  82 - 98 fL Final    MCH 12/10/2024 29.3  27.0 - 31.0 pg Final    MCHC 12/10/2024 33.1  32.0 - 36.0 g/dL Final    RDW 12/10/2024 12.4  11.5 - 14.5 % Final    Platelets 12/10/2024 189  150 - 450 K/uL Final    MPV 12/10/2024 11.3  9.2 - 12.9 fL Final    Immature Granulocytes 12/10/2024 0.7 (H)  0.0 - 0.5 % Final    Gran # (ANC) 12/10/2024 7.3  1.8 - 7.7 K/uL Final    Immature Grans (Abs) 12/10/2024 0.07 (H)  0.00 - 0.04 K/uL Final    Comment: Mild elevation in immature granulocytes is non specific and   can be seen in a variety of conditions including stress response,   acute inflammation, trauma and pregnancy. Correlation with other   laboratory and clinical findings is essential.      Lymph # 12/10/2024 1.8  1.0 - 4.8 K/uL Final    Mono # 12/10/2024 0.3  0.3 - 1.0 K/uL Final    Eos # 12/10/2024 0.1  0.0 - 0.5 K/uL Final    Baso # 12/10/2024 0.01  0.00 - 0.20 K/uL Final    nRBC 12/10/2024 0  0 /100 WBC Final    Gran % 12/10/2024 76.5 (H)  38.0 - 73.0 % Final    Lymph % 12/10/2024 18.5  18.0 - 48.0 % Final    Mono % 12/10/2024 3.6 (L)  4.0 - 15.0 % Final    Eosinophil % 12/10/2024 0.6  0.0 - 8.0 % Final    Basophil % 12/10/2024 0.1  0.0 - 1.9 % Final    Differential Method 12/10/2024 Automated   Final    HIV 1/2 Ag/Ab 12/10/2024 Negative  Negative Final    Rubella IgG Antibodies 12/10/2024 12.3  >=10.0 IU/mL Final    Rubella Immune Status 12/10/2024 Reactive   Final    Comment: 0.0-4.9 IU/mL  Nonreactive (Non-Immune)  5.0-9.9 IU/mL  Indeterminate  10.0-400.0 IU/mL Reactive (Immune)    These results were obtained with the IMMULITE 2000 Rubella  Quantitative IgG assay.  Values obtained from other   manufacturers' assay methods may not be used interchangeably.       Varicella Zoster IgG 12/10/2024 2.010  S/CO Final    Varicella Interpretation 12/10/2024 Positive (A)   Final    Comment: The assay performance in detecting antibodies to VZV   in individuals vaccinated with the FDA-licensed VZV  vaccine is unknown.  Presence of detectable VZV IgG antibodies. A positive   result generally indicates exposure to the pathogen   or administration of specific immunoglobulins, but   it is no indication of active infection or stage   of disease.      Hepatitis B Surface Ag 12/10/2024 Non-reactive  Non-reactive Final    Hep B C IgM 12/10/2024 Non-reactive  Non-reactive Final    Hep A IgM 12/10/2024 Non-reactive  Non-reactive Final    Hepatitis C Ab 12/10/2024 Negative  Negative Final    Treponema Pallidum Antibodies (IgG* 12/10/2024 Nonreactive  Nonreactive Final    Group & Rh 12/10/2024 O POS   Final    Indirect Ina 12/10/2024 NEG   Final    Specimen Outdate 12/10/2024 12/13/2024 23:59   Final   Office Visit on 12/10/2024   Component Date Value Ref Range Status    Chlamydia, Amplified DNA 12/10/2024 Invalid  Not Detected Final    N gonorrhoeae, amplified DNA 12/10/2024 Invalid  Not Detected Final    Urine Culture, Routine 12/10/2024 No significant growth   Final    Final Pathologic Diagnosis 12/10/2024    Final                    Value:Specimen Adequacy  Satisfactory for interpretation. Endocervical component is absent.    [place] Category  Negative for intraepithelial lesion or malignancy.  Inflammation and Candida present.      Interp By HORACIO Antoine(ASCP), Signed on 12/16/2024 at 05:39    Disclaimer 12/10/2024    Final                    Value:The Pap smear is a screening test that aids in the detection of cervical cancer and cancer precursors. Both false positive and false negative results can occur. The test should be used at regular intervals, and positive results should be  confirmed before   definitive therapy.  This liquid based specimen is processed using the , , or  [employer] Thin PrepPAP System. This specimen has been analyzed by the ThinPrep Imaging System ([employer]), an automated imaging and review system which assists the laboratory in   evaluating cells on ThinPrep PAP tests. Following automated imaging, selected fields from every slide are reviewed by a cytotechnologist and/or pathologist.     Screening was performed at [facility], [address], [place] [address].      HPV other High Risk types, PCR 12/10/2024 Negative  Negative Final    Comment: Other HPV genotypes include:   31,33,35,39,45,51,52,56,58,59,66 and 68.      HPV High Risk type 16, PCR 12/10/2024 Negative  Negative Final    HPV High Risk type 18, PCR 12/10/2024 Negative  Negative Final   Admission on 11/19/2024, Discharged on 11/19/2024   Component Date Value Ref Range Status    WBC 11/19/2024 8.88  3.90 - 12.70 K/uL Final    RBC 11/19/2024 4.76  4.00 - 5.40 M/uL Final    Hemoglobin 11/19/2024 14.2  12.0 - 16.0 g/dL Final    Hematocrit 11/19/2024 41.6  37.0 - 48.5 % Final    MCV 11/19/2024 87  82 - 98 fL Final    MCH 11/19/2024 29.8  27.0 - 31.0 pg Final    MCHC 11/19/2024 34.1  32.0 - 36.0 g/dL Final    RDW 11/19/2024 12.3  11.5 - 14.5 % Final    Platelets 11/19/2024 209  150 - 450 K/uL Final    MPV 11/19/2024 10.5  9.2 - 12.9 fL Final    Immature Granulocytes 11/19/2024 0.3  0.0 - 0.5 % Final    Gran # (ANC) 11/19/2024 6.5  1.8 - 7.7 K/uL Final    Immature Grans (Abs) 11/19/2024 0.03  0.00 - 0.04 K/uL Final    Comment: Mild elevation in immature granulocytes is non specific and   can be seen in a variety of conditions including stress response,   acute inflammation, trauma and pregnancy. Correlation with other   laboratory and clinical findings is essential.      Lymph # 11/19/2024 1.8  1.0 - 4.8 K/uL Final    Mono # 11/19/2024 0.5  0.3 -  1.0 K/uL Final    Eos # 11/19/2024 0.1  0.0 - 0.5 K/uL Final    Baso # 11/19/2024 0.02  0.00 - 0.20 K/uL Final    nRBC 11/19/2024 0  0 /100 WBC Final    Gran % 11/19/2024 72.7  38.0 - 73.0 % Final    Lymph % 11/19/2024 19.9  18.0 - 48.0 % Final    Mono % 11/19/2024 6.0  4.0 - 15.0 % Final    Eosinophil % 11/19/2024 0.9  0.0 - 8.0 % Final    Basophil % 11/19/2024 0.2  0.0 - 1.9 % Final    Differential Method 11/19/2024 Automated   Final    Sodium 11/19/2024 137  136 - 145 mmol/L Final    Potassium 11/19/2024 3.8  3.5 - 5.1 mmol/L Final    Chloride 11/19/2024 106  95 - 110 mmol/L Final    CO2 11/19/2024 22 (L)  23 - 29 mmol/L Final    Glucose 11/19/2024 94  70 - 110 mg/dL Final    BUN 11/19/2024 5 (L)  6 - 20 mg/dL Final    Creatinine 11/19/2024 0.7  0.5 - 1.4 mg/dL Final    Calcium 11/19/2024 9.7  8.7 - 10.5 mg/dL Final    Total Protein 11/19/2024 7.3  6.0 - 8.4 g/dL Final    Albumin 11/19/2024 3.7  3.5 - 5.2 g/dL Final    Total Bilirubin 11/19/2024 0.2  0.1 - 1.0 mg/dL Final    Comment: For infants and newborns, interpretation of results should be based  on gestational age, weight and in agreement with clinical  observations.    Premature Infant recommended reference ranges:  Up to 24 hours.............<8.0 mg/dL  Up to 48 hours............<12.0 mg/dL  3-5 days..................<15.0 mg/dL  6-29 days.................<15.0 mg/dL      Alkaline Phosphatase 11/19/2024 53  40 - 150 U/L Final    AST 11/19/2024 13  10 - 40 U/L Final    ALT 11/19/2024 10  10 - 44 U/L Final    eGFR 11/19/2024 >60  >60 mL/min/1.73 m^2 Final    Anion Gap 11/19/2024 9  8 - 16 mmol/L Final    HCG Quant 11/19/2024 70058  See Text mIU/mL Final    Comment: Quantitative HCG Reference Ranges:  Males........................<5.0 mIU/mL  Non-Pregnant Females.........<5.0 mIU/mL  Pregnancy:  Weeks post-LMP...............Range (mIU/mL)  1-10  weeks.....................202-231,000  11-15 weeks..................22,536-234,990  16-22  weeks...................8,007-50,064  23-40 weeks...................1,600-49,413    NOTE:  This assay is not FDA approved for tumor screening,   diagnosis, or monitoring.      Specimen UA 11/19/2024 Urine, Clean Catch   Final    Color, UA 11/19/2024 Yellow  Yellow, Straw, Ember Final    Appearance, UA 11/19/2024 Hazy (A)  Clear Final    pH, UA 11/19/2024 6.0  5.0 - 8.0 Final    Specific Gravity, UA 11/19/2024 1.010  1.005 - 1.030 Final    Protein, UA 11/19/2024 Negative  Negative Final    Comment: Recommend a 24 hour urine protein or a urine   protein/creatinine ratio if globulin induced proteinuria is  clinically suspected.      Glucose, UA 11/19/2024 Negative  Negative Final    Ketones, UA 11/19/2024 Negative  Negative Final    Bilirubin (UA) 11/19/2024 Negative  Negative Final    Occult Blood UA 11/19/2024 Negative  Negative Final    Nitrite, UA 11/19/2024 Negative  Negative Final    Urobilinogen, UA 11/19/2024 Negative  <2.0 EU/dL Final    Leukocytes, UA 11/19/2024 2+ (A)  Negative Final    Lipase 11/19/2024 35  4 - 60 U/L Final    RBC, UA 11/19/2024 3  0 - 4 /hpf Final    WBC, UA 11/19/2024 14 (H)  0 - 5 /hpf Final    Bacteria 11/19/2024 Many (A)  None-Occ /hpf Final    Squam Epithel, UA 11/19/2024 5  /hpf Final    Microscopic Comment 11/19/2024 SEE COMMENT   Final    Comment: Other formed elements not mentioned in the report are not   present in the microscopic examination.       Urine Culture, Routine 11/19/2024 No growth   Final       Imaging  No results found.    Assessment  1. Palpitations   Based on her Apple watch tracing, doubt significant arrhythmia  - EKG 12-lead  - TSH; Future  - Echo; Future    2. Shortness of breath during pregnancy  - Ambulatory referral/consult to Cardiology      Plan and Discussion    Will check a TSH.  Check an echocardiogram.  If all okay, reassurance.  I asked her to continue to try to capture some Apple watch tracings and send them to me through the portal for review.    The  ASCVD Risk score (Sweetie DK, et al., 2019) failed to calculate for the following reasons:    The 2019 ASCVD risk score is only valid for ages 40 to 79     Follow Up  Follow up for test results.      Giuliano Hicks MD         [1]   Current Outpatient Medications   Medication Sig Dispense Refill    docusate sodium (COLACE) 100 MG capsule Take 1 capsule (100 mg total) by mouth 2 (two) times daily. 60 capsule 11    hydrocortisone-pramoxine (PROCTOFOAM HC) rectal foam Place 1 applicator rectally 3 (three) times daily. 90 applicator 0    ondansetron (ZOFRAN) 4 MG tablet Take 1 tablet (4 mg total) by mouth every 6 (six) hours. 30 tablet 1    ondansetron (ZOFRAN-ODT) 8 MG TbDL Take 1 tablet (8 mg total) by mouth every 6 (six) hours as needed (anusea). 30 tablet 1    PNV,calcium 72/iron/folic acid (PRENATAL VITAMIN) Tab Take 1 tablet by mouth once daily. 30 tablet 11    polyethylene glycol (GLYCOLAX) 17 gram/dose powder Take one capful daily for 5-7 days until normal bowel movement.  Then 1-2 times per week for remainder of pregnancy. 595 g 3     No current facility-administered medications for this visit.   [2]   Social History  Socioeconomic History    Marital status:    Tobacco Use    Smoking status: Never    Smokeless tobacco: Never   Substance and Sexual Activity    Alcohol use: Never    Drug use: Never    Sexual activity: Yes     Partners: Male     Birth control/protection: None     Social Drivers of Health     Financial Resource Strain: Low Risk  (4/21/2025)    Overall Financial Resource Strain (CARDIA)     Difficulty of Paying Living Expenses: Not hard at all   Food Insecurity: No Food Insecurity (4/21/2025)    Hunger Vital Sign     Worried About Running Out of Food in the Last Year: Never true     Ran Out of Food in the Last Year: Never true   Transportation Needs: No Transportation Needs (4/21/2025)    PRAPARE - Transportation     Lack of Transportation (Medical): No     Lack of Transportation  (Non-Medical): No   Physical Activity: Insufficiently Active (4/21/2025)    Exercise Vital Sign     Days of Exercise per Week: 4 days     Minutes of Exercise per Session: 30 min   Stress: No Stress Concern Present (4/21/2025)    [employer] [place] [employer] - Occupational Stress Questionnaire     Feeling of Stress : Not at all   Housing Stability: Low Risk  (4/21/2025)    Housing Stability Vital Sign     Unable to Pay for Housing in the Last Year: No     Number of Times Moved in the Last Year: 1     Homeless in the Last Year: No

## 2025-04-23 LAB
OHS QRS DURATION: 70 MS
OHS QTC CALCULATION: 445 MS

## 2025-04-28 ENCOUNTER — HOSPITAL ENCOUNTER (OUTPATIENT)
Dept: CARDIOLOGY | Facility: OTHER | Age: 30
Discharge: HOME | End: 2025-04-28
Attending: INTERNAL MEDICINE
Payer: OTHER GOVERNMENT

## 2025-04-28 ENCOUNTER — RESULTS FOLLOW-UP (OUTPATIENT)
Dept: CARDIOLOGY | Facility: CLINIC | Age: 30
End: 2025-04-28

## 2025-04-28 VITALS
BODY MASS INDEX: 35 KG/M2 | DIASTOLIC BLOOD PRESSURE: 84 MMHG | HEART RATE: 84 BPM | SYSTOLIC BLOOD PRESSURE: 122 MMHG | WEIGHT: 223 LBS | HEIGHT: 67 IN

## 2025-04-28 DIAGNOSIS — R00.2 PALPITATIONS: ICD-10-CM

## 2025-04-28 LAB
ASCENDING AORTA: 2.6 CM
AV INDEX (PROSTH): 0.83
AV MEAN GRADIENT: 4 MMHG
AV PEAK GRADIENT: 6 MMHG
AV VALVE AREA BY VELOCITY RATIO: 2.6 CM²
AV VALVE AREA: 2.6 CM²
AV VELOCITY RATIO: 0.83
BSA FOR ECHO PROCEDURE: 2.19 M2
CV ECHO LV RWT: 0.43 CM
DOP CALC AO PEAK VEL: 1.2 M/S
DOP CALC AO VTI: 23 CM
DOP CALC LVOT AREA: 3.1 CM2
DOP CALC LVOT DIAMETER: 2 CM
DOP CALC LVOT PEAK VEL: 1 M/S
DOP CALC LVOT STROKE VOLUME: 60.3 CM3
DOP CALC RVOT PEAK VEL: 0.62 M/S
DOP CALCLVOT PEAK VEL VTI: 19.2 CM
E WAVE DECELERATION TIME: 139 MSEC
E/A RATIO: 1.21
E/E' RATIO: 4 M/S
ECHO LV POSTERIOR WALL: 1 CM (ref 0.6–1.1)
FRACTIONAL SHORTENING: 37 % (ref 28–44)
INTERVENTRICULAR SEPTUM: 1 CM (ref 0.6–1.1)
IVC DIAMETER: 1.52 CM
LA MAJOR: 5.4 CM
LA MINOR: 4.8 CM
LA WIDTH: 3.6 CM
LEFT ATRIUM AREA SYSTOLIC (APICAL 2 CHAMBER): 17.85 CM2
LEFT ATRIUM AREA SYSTOLIC (APICAL 4 CHAMBER): 18.03 CM2
LEFT ATRIUM SIZE: 4 CM
LEFT ATRIUM VOLUME INDEX MOD: 24 ML/M2
LEFT ATRIUM VOLUME INDEX: 29 ML/M2
LEFT ATRIUM VOLUME MOD: 50 ML
LEFT ATRIUM VOLUME: 62 CM3
LEFT INTERNAL DIMENSION IN SYSTOLE: 2.9 CM (ref 2.1–4)
LEFT VENTRICLE DIASTOLIC VOLUME INDEX: 44.81 ML/M2
LEFT VENTRICLE DIASTOLIC VOLUME: 95 ML
LEFT VENTRICLE END SYSTOLIC VOLUME APICAL 2 CHAMBER: 50.79 ML
LEFT VENTRICLE END SYSTOLIC VOLUME APICAL 4 CHAMBER: 46.14 ML
LEFT VENTRICLE MASS INDEX: 74.9 G/M2
LEFT VENTRICLE SYSTOLIC VOLUME INDEX: 15.1 ML/M2
LEFT VENTRICLE SYSTOLIC VOLUME: 32 ML
LEFT VENTRICULAR INTERNAL DIMENSION IN DIASTOLE: 4.6 CM (ref 3.5–6)
LEFT VENTRICULAR MASS: 158.8 G
LV LATERAL E/E' RATIO: 3.8 M/S
LV SEPTAL E/E' RATIO: 4.9 M/S
LVED V (TEICH): 95.44 ML
LVES V (TEICH): 32.28 ML
LVOT MG: 2.23 MMHG
LVOT MV: 0.72 CM/S
MV PEAK A VEL: 0.56 M/S
MV PEAK E VEL: 0.68 M/S
MV STENOSIS PRESSURE HALF TIME: 40.19 MS
MV VALVE AREA P 1/2 METHOD: 5.47 CM2
OHS CV RV/LV RATIO: 0.87 CM
PISA TR MAX VEL: 2.1 M/S
PULM VEIN S/D RATIO: 0.7
PV PEAK D VEL: 0.53 M/S
PV PEAK GRADIENT: 3 MMHG
PV PEAK S VEL: 0.37 M/S
PV PEAK VELOCITY: 0.84 M/S
RA MAJOR: 4.72 CM
RA PRESSURE ESTIMATED: 3 MMHG
RA WIDTH: 4 CM
RIGHT VENTRICLE DIASTOLIC BASEL DIMENSION: 4 CM
RV TB RVSP: 5 MMHG
RV TISSUE DOPPLER FREE WALL SYSTOLIC VELOCITY 1 (APICAL 4 CHAMBER VIEW): 13.35 CM/S
SINUS: 2.6 CM
STJ: 2.5 CM
TDI LATERAL: 0.18 M/S
TDI SEPTAL: 0.14 M/S
TDI: 0.16 M/S
TR MAX PG: 17 MMHG
TRICUSPID ANNULAR PLANE SYSTOLIC EXCURSION: 2.3 CM
TV REST PULMONARY ARTERY PRESSURE: 21 MMHG
Z-SCORE OF LEFT VENTRICULAR DIMENSION IN END DIASTOLE: -3.76
Z-SCORE OF LEFT VENTRICULAR DIMENSION IN END SYSTOLE: -2.72

## 2025-04-28 PROCEDURE — 93306 TTE W/DOPPLER COMPLETE: CPT | Mod: 26,,, | Performed by: INTERNAL MEDICINE

## 2025-04-28 PROCEDURE — 93306 TTE W/DOPPLER COMPLETE: CPT

## 2025-05-01 ENCOUNTER — ROUTINE PRENATAL (OUTPATIENT)
Dept: OBSTETRICS AND GYNECOLOGY | Facility: CLINIC | Age: 30
End: 2025-05-01
Payer: OTHER GOVERNMENT

## 2025-05-01 ENCOUNTER — PATIENT MESSAGE (OUTPATIENT)
Dept: OTHER | Facility: OTHER | Age: 30
End: 2025-05-01
Payer: OTHER GOVERNMENT

## 2025-05-01 VITALS
DIASTOLIC BLOOD PRESSURE: 78 MMHG | BODY MASS INDEX: 34.87 KG/M2 | WEIGHT: 222.69 LBS | SYSTOLIC BLOOD PRESSURE: 109 MMHG

## 2025-05-01 DIAGNOSIS — Z34.00 SUPERVISION OF NORMAL FIRST PREGNANCY, ANTEPARTUM: Primary | ICD-10-CM

## 2025-05-01 DIAGNOSIS — O26.849 UTERINE SIZE-DATE DISCREPANCY, ANTEPARTUM: ICD-10-CM

## 2025-05-01 PROCEDURE — 99213 OFFICE O/P EST LOW 20 MIN: CPT | Mod: PBBFAC | Performed by: OBSTETRICS & GYNECOLOGY

## 2025-05-01 PROCEDURE — 99999 PR PBB SHADOW E&M-EST. PATIENT-LVL III: CPT | Mod: PBBFAC,,, | Performed by: OBSTETRICS & GYNECOLOGY

## 2025-05-01 PROCEDURE — 87653 STREP B DNA AMP PROBE: CPT | Performed by: OBSTETRICS & GYNECOLOGY

## 2025-05-01 NOTE — PROGRESS NOTES
28yo  at 35w0d.   Overall doing well today.  Seen by cards - normal eval.  Hemorrhoids stable.  No ctx/vb/lof.  +FM.   No other complaints today.  Taking PNV.      A/P:   Routine OB visit today.   F/u with cardiology as scheduled, ED precautions given.     Continue daily colace. Encourage PO hydration.  GBS pending today.   OB ED precautions given.   Counseling done, precautions given, all questions answered.  RTC 2 wk for OB visit.     Yesenia Orozco MD

## 2025-05-03 LAB — GROUP B STREPTOCOCCUS, PCR (OHS): NEGATIVE

## 2025-05-07 ENCOUNTER — PROCEDURE VISIT (OUTPATIENT)
Dept: MATERNAL FETAL MEDICINE | Facility: CLINIC | Age: 30
End: 2025-05-07
Payer: OTHER GOVERNMENT

## 2025-05-07 DIAGNOSIS — O26.849 UTERINE SIZE-DATE DISCREPANCY, ANTEPARTUM: ICD-10-CM

## 2025-05-07 PROCEDURE — 76816 OB US FOLLOW-UP PER FETUS: CPT | Mod: PBBFAC | Performed by: OBSTETRICS & GYNECOLOGY

## 2025-05-12 ENCOUNTER — ROUTINE PRENATAL (OUTPATIENT)
Dept: OBSTETRICS AND GYNECOLOGY | Facility: CLINIC | Age: 30
End: 2025-05-12
Payer: OTHER GOVERNMENT

## 2025-05-12 ENCOUNTER — LAB VISIT (OUTPATIENT)
Dept: LAB | Facility: OTHER | Age: 30
End: 2025-05-12
Attending: OBSTETRICS & GYNECOLOGY
Payer: OTHER GOVERNMENT

## 2025-05-12 VITALS
DIASTOLIC BLOOD PRESSURE: 86 MMHG | BODY MASS INDEX: 35.25 KG/M2 | HEART RATE: 108 BPM | WEIGHT: 225.06 LBS | SYSTOLIC BLOOD PRESSURE: 122 MMHG

## 2025-05-12 DIAGNOSIS — Z34.00 SUPERVISION OF NORMAL FIRST PREGNANCY, ANTEPARTUM: Primary | ICD-10-CM

## 2025-05-12 DIAGNOSIS — O13.3 GESTATIONAL HYPERTENSION, THIRD TRIMESTER: ICD-10-CM

## 2025-05-12 LAB
ABSOLUTE EOSINOPHIL (OHS): 0.07 K/UL
ABSOLUTE MONOCYTE (OHS): 0.79 K/UL (ref 0.3–1)
ABSOLUTE NEUTROPHIL COUNT (OHS): 9.73 K/UL (ref 1.8–7.7)
ALBUMIN SERPL BCP-MCNC: 3.2 G/DL (ref 3.5–5.2)
ALP SERPL-CCNC: 125 UNIT/L (ref 40–150)
ALT SERPL W/O P-5'-P-CCNC: 13 UNIT/L (ref 10–44)
ANION GAP (OHS): 16 MMOL/L (ref 8–16)
AST SERPL-CCNC: 19 UNIT/L (ref 11–45)
BASOPHILS # BLD AUTO: 0.03 K/UL
BASOPHILS NFR BLD AUTO: 0.2 %
BILIRUB SERPL-MCNC: 0.3 MG/DL (ref 0.1–1)
BUN SERPL-MCNC: 7 MG/DL (ref 6–20)
CALCIUM SERPL-MCNC: 9.6 MG/DL (ref 8.7–10.5)
CHLORIDE SERPL-SCNC: 104 MMOL/L (ref 95–110)
CO2 SERPL-SCNC: 17 MMOL/L (ref 23–29)
CREAT SERPL-MCNC: 0.5 MG/DL (ref 0.5–1.4)
CREAT UR-MCNC: 79 MG/DL (ref 15–325)
ERYTHROCYTE [DISTWIDTH] IN BLOOD BY AUTOMATED COUNT: 13.5 % (ref 11.5–14.5)
GFR SERPLBLD CREATININE-BSD FMLA CKD-EPI: >60 ML/MIN/1.73/M2
GLUCOSE SERPL-MCNC: 72 MG/DL (ref 70–110)
HCT VFR BLD AUTO: 40 % (ref 37–48.5)
HGB BLD-MCNC: 13.5 GM/DL (ref 12–16)
IMM GRANULOCYTES # BLD AUTO: 0.17 K/UL (ref 0–0.04)
IMM GRANULOCYTES NFR BLD AUTO: 1.4 % (ref 0–0.5)
LYMPHOCYTES # BLD AUTO: 1.78 K/UL (ref 1–4.8)
MCH RBC QN AUTO: 29.9 PG (ref 27–31)
MCHC RBC AUTO-ENTMCNC: 33.8 G/DL (ref 32–36)
MCV RBC AUTO: 89 FL (ref 82–98)
NUCLEATED RBC (/100WBC) (OHS): 0 /100 WBC
PLATELET # BLD AUTO: 151 K/UL (ref 150–450)
PMV BLD AUTO: 11.6 FL (ref 9.2–12.9)
POTASSIUM SERPL-SCNC: 4.2 MMOL/L (ref 3.5–5.1)
PROT SERPL-MCNC: 7.5 GM/DL (ref 6–8.4)
PROT UR-MCNC: 17 MG/DL
PROT/CREAT UR: 0.22 MG/G{CREAT}
RBC # BLD AUTO: 4.52 M/UL (ref 4–5.4)
RELATIVE EOSINOPHIL (OHS): 0.6 %
RELATIVE LYMPHOCYTE (OHS): 14.2 % (ref 18–48)
RELATIVE MONOCYTE (OHS): 6.3 % (ref 4–15)
RELATIVE NEUTROPHIL (OHS): 77.3 % (ref 38–73)
SODIUM SERPL-SCNC: 137 MMOL/L (ref 136–145)
WBC # BLD AUTO: 12.57 K/UL (ref 3.9–12.7)

## 2025-05-12 PROCEDURE — 82570 ASSAY OF URINE CREATININE: CPT | Performed by: OBSTETRICS & GYNECOLOGY

## 2025-05-12 PROCEDURE — 85025 COMPLETE CBC W/AUTO DIFF WBC: CPT

## 2025-05-12 PROCEDURE — 99999 PR PBB SHADOW E&M-EST. PATIENT-LVL III: CPT | Mod: PBBFAC,,, | Performed by: OBSTETRICS & GYNECOLOGY

## 2025-05-12 PROCEDURE — 36415 COLL VENOUS BLD VENIPUNCTURE: CPT

## 2025-05-12 PROCEDURE — 99213 OFFICE O/P EST LOW 20 MIN: CPT | Mod: PBBFAC | Performed by: OBSTETRICS & GYNECOLOGY

## 2025-05-12 PROCEDURE — 80053 COMPREHEN METABOLIC PANEL: CPT

## 2025-05-12 NOTE — PROGRESS NOTES
28yo  at 36w4d. Overall doing well today.  Denies current HA/visual changes.  Hemorrhoids stable.  No ctx/vb/lof.  +FM.   No other complaints today.  Taking PNV.        A/P:   HR OB visit - now meets criteria for gHTN - will plan IOL next week ( at 4pm).   PreE labs pending today.   Continue daily colace. Encourage PO hydration.  GBS pending today.   OB ED precautions given.   Counseling done, precautions given, all questions answered.  RTC postpartum.     Yesenia Orozco MD

## 2025-05-13 ENCOUNTER — PATIENT MESSAGE (OUTPATIENT)
Dept: OBSTETRICS AND GYNECOLOGY | Facility: CLINIC | Age: 30
End: 2025-05-13
Payer: OTHER GOVERNMENT

## 2025-05-20 ENCOUNTER — ANESTHESIA EVENT (OUTPATIENT)
Dept: OBSTETRICS AND GYNECOLOGY | Facility: OTHER | Age: 30
End: 2025-05-20
Payer: OTHER GOVERNMENT

## 2025-05-20 ENCOUNTER — HOSPITAL ENCOUNTER (INPATIENT)
Facility: OTHER | Age: 30
LOS: 4 days | Discharge: HOME | End: 2025-05-24
Attending: OBSTETRICS & GYNECOLOGY | Admitting: STUDENT IN AN ORGANIZED HEALTH CARE EDUCATION/TRAINING PROGRAM
Payer: OTHER GOVERNMENT

## 2025-05-20 DIAGNOSIS — O13.3 GESTATIONAL HYPERTENSION, THIRD TRIMESTER: ICD-10-CM

## 2025-05-20 DIAGNOSIS — O36.8390 NON-REASSURING FETAL CARDIOTOCOGRAPHIC TRACING: ICD-10-CM

## 2025-05-20 DIAGNOSIS — Z34.90 ENCOUNTER FOR INDUCTION OF LABOR: ICD-10-CM

## 2025-05-20 DIAGNOSIS — Z98.891 STATUS POST PRIMARY LOW TRANSVERSE CESAREAN SECTION: Primary | ICD-10-CM

## 2025-05-20 PROBLEM — K64.9 HEMORRHOIDS: Status: ACTIVE | Noted: 2025-05-20

## 2025-05-20 LAB
ABO + RH BLD: NORMAL
ABSOLUTE EOSINOPHIL (OHS): 0.05 K/UL
ABSOLUTE MONOCYTE (OHS): 0.69 K/UL (ref 0.3–1)
ABSOLUTE NEUTROPHIL COUNT (OHS): 9.19 K/UL (ref 1.8–7.7)
ALBUMIN SERPL BCP-MCNC: 3.1 G/DL (ref 3.5–5.2)
ALP SERPL-CCNC: 112 UNIT/L (ref 40–150)
ALT SERPL W/O P-5'-P-CCNC: 12 UNIT/L (ref 10–44)
ANION GAP (OHS): 11 MMOL/L (ref 8–16)
AST SERPL-CCNC: 17 UNIT/L (ref 11–45)
BASOPHILS # BLD AUTO: 0.02 K/UL
BASOPHILS NFR BLD AUTO: 0.2 %
BILIRUB SERPL-MCNC: 0.2 MG/DL (ref 0.1–1)
BLD GP AB SCN CELLS X3 SERPL QL: NORMAL
BUN SERPL-MCNC: 5 MG/DL (ref 6–20)
CALCIUM SERPL-MCNC: 9 MG/DL (ref 8.7–10.5)
CHLORIDE SERPL-SCNC: 108 MMOL/L (ref 95–110)
CO2 SERPL-SCNC: 17 MMOL/L (ref 23–29)
CREAT SERPL-MCNC: 0.5 MG/DL (ref 0.5–1.4)
ERYTHROCYTE [DISTWIDTH] IN BLOOD BY AUTOMATED COUNT: 13.3 % (ref 11.5–14.5)
GFR SERPLBLD CREATININE-BSD FMLA CKD-EPI: >60 ML/MIN/1.73/M2
GLUCOSE SERPL-MCNC: 73 MG/DL (ref 70–110)
HCT VFR BLD AUTO: 36.8 % (ref 37–48.5)
HGB BLD-MCNC: 12.6 GM/DL (ref 12–16)
HIV 1+2 AB+HIV1 P24 AG SERPL QL IA: NEGATIVE
IMM GRANULOCYTES # BLD AUTO: 0.08 K/UL (ref 0–0.04)
IMM GRANULOCYTES NFR BLD AUTO: 0.7 % (ref 0–0.5)
LYMPHOCYTES # BLD AUTO: 1.97 K/UL (ref 1–4.8)
MCH RBC QN AUTO: 30 PG (ref 27–31)
MCHC RBC AUTO-ENTMCNC: 34.2 G/DL (ref 32–36)
MCV RBC AUTO: 88 FL (ref 82–98)
NUCLEATED RBC (/100WBC) (OHS): 0 /100 WBC
PLATELET # BLD AUTO: 135 K/UL (ref 150–450)
PMV BLD AUTO: 12.2 FL (ref 9.2–12.9)
POTASSIUM SERPL-SCNC: 3.6 MMOL/L (ref 3.5–5.1)
PROT SERPL-MCNC: 7 GM/DL (ref 6–8.4)
RBC # BLD AUTO: 4.2 M/UL (ref 4–5.4)
RELATIVE EOSINOPHIL (OHS): 0.4 %
RELATIVE LYMPHOCYTE (OHS): 16.4 % (ref 18–48)
RELATIVE MONOCYTE (OHS): 5.8 % (ref 4–15)
RELATIVE NEUTROPHIL (OHS): 76.5 % (ref 38–73)
SODIUM SERPL-SCNC: 136 MMOL/L (ref 136–145)
SPECIMEN OUTDATE: NORMAL
T PALLIDUM IGG+IGM SER QL: NEGATIVE
WBC # BLD AUTO: 12 K/UL (ref 3.9–12.7)

## 2025-05-20 PROCEDURE — 80053 COMPREHEN METABOLIC PANEL: CPT

## 2025-05-20 PROCEDURE — 87389 HIV-1 AG W/HIV-1&-2 AB AG IA: CPT | Performed by: OBSTETRICS & GYNECOLOGY

## 2025-05-20 PROCEDURE — 86593 SYPHILIS TEST NON-TREP QUANT: CPT

## 2025-05-20 PROCEDURE — 0U7C7DJ DILATION OF CERVIX WITH INTRALUM DEV, TEMP, VIA OPENING: ICD-10-PCS | Performed by: OBSTETRICS & GYNECOLOGY

## 2025-05-20 PROCEDURE — 85025 COMPLETE CBC W/AUTO DIFF WBC: CPT

## 2025-05-20 PROCEDURE — 86900 BLOOD TYPING SEROLOGIC ABO: CPT

## 2025-05-20 PROCEDURE — 25000003 PHARM REV CODE 250

## 2025-05-20 PROCEDURE — 63600175 PHARM REV CODE 636 W HCPCS

## 2025-05-20 PROCEDURE — 72100002 HC LABOR CARE, 1ST 8 HOURS

## 2025-05-20 PROCEDURE — 11000001 HC ACUTE MED/SURG PRIVATE ROOM

## 2025-05-20 PROCEDURE — 3E0DXGC INTRODUCTION OF OTHER THERAPEUTIC SUBSTANCE INTO MOUTH AND PHARYNX, EXTERNAL APPROACH: ICD-10-PCS | Performed by: OBSTETRICS & GYNECOLOGY

## 2025-05-20 RX ORDER — TRANEXAMIC ACID 10 MG/ML
1000 INJECTION, SOLUTION INTRAVENOUS EVERY 30 MIN PRN
Status: DISCONTINUED | OUTPATIENT
Start: 2025-05-20 | End: 2025-05-21

## 2025-05-20 RX ORDER — MISOPROSTOL 200 UG/1
800 TABLET ORAL ONCE AS NEEDED
Status: DISCONTINUED | OUTPATIENT
Start: 2025-05-20 | End: 2025-05-21

## 2025-05-20 RX ORDER — LIDOCAINE HYDROCHLORIDE 10 MG/ML
10 INJECTION, SOLUTION INFILTRATION; PERINEURAL ONCE AS NEEDED
Status: DISCONTINUED | OUTPATIENT
Start: 2025-05-20 | End: 2025-05-21

## 2025-05-20 RX ORDER — SODIUM CHLORIDE 9 MG/ML
INJECTION, SOLUTION INTRAVENOUS
Status: DISCONTINUED | OUTPATIENT
Start: 2025-05-20 | End: 2025-05-21

## 2025-05-20 RX ORDER — OXYTOCIN-SODIUM CHLORIDE 0.9% IV SOLN 30 UNIT/500ML 30-0.9/5 UT/ML-%
10 SOLUTION INTRAVENOUS ONCE AS NEEDED
Status: DISCONTINUED | OUTPATIENT
Start: 2025-05-20 | End: 2025-05-21

## 2025-05-20 RX ORDER — HYDROCORTISONE 25 MG/G
CREAM TOPICAL 2 TIMES DAILY
Status: DISCONTINUED | OUTPATIENT
Start: 2025-05-20 | End: 2025-05-21

## 2025-05-20 RX ORDER — SODIUM CHLORIDE, SODIUM LACTATE, POTASSIUM CHLORIDE, CALCIUM CHLORIDE 600; 310; 30; 20 MG/100ML; MG/100ML; MG/100ML; MG/100ML
INJECTION, SOLUTION INTRAVENOUS CONTINUOUS
Status: DISCONTINUED | OUTPATIENT
Start: 2025-05-20 | End: 2025-05-21

## 2025-05-20 RX ORDER — DIPHENOXYLATE HYDROCHLORIDE AND ATROPINE SULFATE 2.5; .025 MG/1; MG/1
2 TABLET ORAL EVERY 6 HOURS PRN
Status: DISCONTINUED | OUTPATIENT
Start: 2025-05-20 | End: 2025-05-21

## 2025-05-20 RX ORDER — SIMETHICONE 80 MG
1 TABLET,CHEWABLE ORAL 4 TIMES DAILY PRN
Status: DISCONTINUED | OUTPATIENT
Start: 2025-05-20 | End: 2025-05-21

## 2025-05-20 RX ORDER — CARBOPROST TROMETHAMINE 250 UG/ML
250 INJECTION, SOLUTION INTRAMUSCULAR
Status: DISCONTINUED | OUTPATIENT
Start: 2025-05-20 | End: 2025-05-21

## 2025-05-20 RX ORDER — METHYLERGONOVINE MALEATE 0.2 MG/ML
200 INJECTION INTRAVENOUS ONCE AS NEEDED
Status: DISCONTINUED | OUTPATIENT
Start: 2025-05-20 | End: 2025-05-21

## 2025-05-20 RX ORDER — OXYTOCIN-SODIUM CHLORIDE 0.9% IV SOLN 30 UNIT/500ML 30-0.9/5 UT/ML-%
95 SOLUTION INTRAVENOUS CONTINUOUS PRN
Status: DISCONTINUED | OUTPATIENT
Start: 2025-05-20 | End: 2025-05-21

## 2025-05-20 RX ORDER — CEFAZOLIN 2 G/1
2 INJECTION, POWDER, FOR SOLUTION INTRAMUSCULAR; INTRAVENOUS ONCE AS NEEDED
Status: DISCONTINUED | OUTPATIENT
Start: 2025-05-20 | End: 2025-05-21

## 2025-05-20 RX ORDER — ONDANSETRON 8 MG/1
8 TABLET, ORALLY DISINTEGRATING ORAL EVERY 8 HOURS PRN
Status: DISCONTINUED | OUTPATIENT
Start: 2025-05-20 | End: 2025-05-21

## 2025-05-20 RX ORDER — OXYTOCIN-SODIUM CHLORIDE 0.9% IV SOLN 30 UNIT/500ML 30-0.9/5 UT/ML-%
95 SOLUTION INTRAVENOUS ONCE AS NEEDED
Status: DISCONTINUED | OUTPATIENT
Start: 2025-05-20 | End: 2025-05-21

## 2025-05-20 RX ORDER — CALCIUM CARBONATE 200(500)MG
500 TABLET,CHEWABLE ORAL 3 TIMES DAILY PRN
Status: DISCONTINUED | OUTPATIENT
Start: 2025-05-20 | End: 2025-05-21

## 2025-05-20 RX ORDER — OXYTOCIN 10 [USP'U]/ML
10 INJECTION, SOLUTION INTRAMUSCULAR; INTRAVENOUS ONCE AS NEEDED
Status: DISCONTINUED | OUTPATIENT
Start: 2025-05-20 | End: 2025-05-21

## 2025-05-20 RX ORDER — OXYTOCIN-SODIUM CHLORIDE 0.9% IV SOLN 30 UNIT/500ML 30-0.9/5 UT/ML-%
0-32 SOLUTION INTRAVENOUS CONTINUOUS
Status: DISCONTINUED | OUTPATIENT
Start: 2025-05-20 | End: 2025-05-21

## 2025-05-20 RX ADMIN — SODIUM CHLORIDE, POTASSIUM CHLORIDE, SODIUM LACTATE AND CALCIUM CHLORIDE 125 ML/HR: 600; 310; 30; 20 INJECTION, SOLUTION INTRAVENOUS at 06:05

## 2025-05-20 RX ADMIN — HYDROCORTISONE: 25 CREAM TOPICAL at 07:05

## 2025-05-20 RX ADMIN — MISOPROSTOL 50 MCG: 100 TABLET ORAL at 05:05

## 2025-05-20 RX ADMIN — OXYTOCIN-SODIUM CHLORIDE 0.9% IV SOLN 30 UNIT/500ML 4 MILLI-UNITS/MIN: 30-0.9/5 SOLUTION at 10:05

## 2025-05-20 NOTE — ANESTHESIA PREPROCEDURE EVALUATION
[facility]  Anesthesia Pre-Operative Evaluation     Patient Name: Penelope Velez  YOB: 1995  MRN: [id number]  [facility]: [id number]      Code Status: No Order   Date of Procedure:   Anesthesia: * No surgery found * Procedure: * No surgery found *  Pre-Operative Diagnosis: * No surgery found *  Proceduralist: * Surgery not found * * Surgery not found *        SUBJECTIVE:     Pre-operative evaluation for * No surgery found *     2025    Penelope Velez is a 29 y.o. female with left sciatic pain.     OB History    Para Term  AB Living   1        SAB IAB Ectopic Multiple Live Births             # Outcome Date GA Lbr Eb/2nd Weight Sex Type Anes PTL Lv   1 Current              Patient now presents for the above procedure(s).       No current facility-administered medications for this encounter.        ________________________________________  Results for orders placed during the hospital encounter of 25    Echo    Interpretation Summary    Left Ventricle: The left ventricle is normal in size. Normal wall thickness. There is normal systolic function with a visually estimated ejection fraction of 60 - 65%. There is normal diastolic function.    Right Ventricle: The right ventricle is normal in size. Wall thickness is normal. Systolic function is normal.    ________________________________________    Prev airway: None documented.            LDA: None documented.       Drips: None documented.      Problem List[1]    Review of patient's allergies indicates:  No Known Allergies    Current Inpatient Medications:       Medications Ordered Prior to Encounter[2]    No past surgical history on file.    Social History:  Tobacco Use: Low Risk  (2025)    Patient History     Smoking Tobacco Use: Never     Smokeless Tobacco Use: Never     Passive Exposure: Not on file       Alcohol Use: Not At Risk  (4/21/2025)    AUDIT-C     Frequency of Alcohol Consumption: Never     Average Number of Drinks: Patient does not drink     Frequency of Binge Drinking: Never       OBJECTIVE:     Vital Signs Range:  BMI Readings from Last 1 Encounters:   05/12/25 35.25 kg/m²               Significant Labs:        Component Value Date/Time    WBC 12.57 05/12/2025 1541    HGB 13.5 05/12/2025 1541    HGB 12.8 03/14/2025 1439    HCT 40.0 05/12/2025 1541    HCT 38.7 03/14/2025 1439    HCT 42 10/19/2024 1122     05/12/2025 1541     03/14/2025 1439     05/12/2025 1541     12/10/2024 1250    K 4.2 05/12/2025 1541    K 3.5 12/10/2024 1250     05/12/2025 1541     12/10/2024 1250    CO2 17 (L) 05/12/2025 1541    CO2 18 (L) 12/10/2024 1250    GLU 72 05/12/2025 1541     12/10/2024 1250    BUN 7 05/12/2025 1541    CREATININE 0.5 05/12/2025 1541    CALCIUM 9.6 05/12/2025 1541    CALCIUM 9.4 12/10/2024 1250    ALBUMIN 3.2 (L) 05/12/2025 1541    ALBUMIN 3.5 12/10/2024 1250    PROT 7.5 05/12/2025 1541    PROT 7.2 12/10/2024 1250    ALKPHOS 125 05/12/2025 1541    ALKPHOS 58 12/10/2024 1250    BILITOT 0.3 05/12/2025 1541    BILITOT 0.2 12/10/2024 1250    AST 19 05/12/2025 1541    AST 16 12/10/2024 1250    ALT 13 05/12/2025 1541    ALT 11 12/10/2024 1250        Please see Results Review for additional labs.     Diagnostic Studies: No relevant studies.    EKG:   Results for orders placed or performed in visit on 04/22/25   EKG 12-lead    Collection Time: 04/22/25  1:17 PM   Result Value Ref Range    QRS Duration 70 ms    [facility] QTC Calculation 445 ms    Narrative    Test Reason : R00.2,    Vent. Rate :  92 BPM     Atrial Rate :  92 BPM     P-R Int : 132 ms          QRS Dur :  70 ms      QT Int : 360 ms       P-R-T Axes :  19  71  22 degrees    QTcB Int : 445 ms    Normal sinus rhythm  Normal ECG  No previous ECGs available  Confirmed by SANDOVAL Hicks ([id number]) on 4/23/2025 1:09:41 PM    Referred By: RACH SEGUNDO            Confirmed By: SANDOVAL Hicks       ECHO:  See subjective, if available.      ASSESSMENT/PLAN:           Pre-op Assessment    I have reviewed the Patient Summary Reports.    I have reviewed the NPO Status.   I have reviewed the Medications.     Review of Systems  Anesthesia Hx:  No problems with previous Anesthesia   History of prior surgery of interest to airway management or planning:  Previous anesthesia: General        Denies Family Hx of Anesthesia complications.     Hepatic/GI:      Liver Disease, Hepatitis           Liver Disease, Hepatitis        Neurological:    Neuromuscular Disease,                                 Neuromuscular Disease       Physical Exam  General: Well nourished, Cooperative, Alert and Oriented    Airway:  Mallampati: I   Mouth Opening: Normal  TM Distance: Normal  Tongue: Normal  Neck ROM: Normal ROM    Dental:  Intact    Chest/Lungs:  Clear to auscultation, Normal Respiratory Rate    Heart:  Rate: Normal  Rhythm: Regular Rhythm  Sounds: Normal        Anesthesia Plan  Type of Anesthesia, risks & benefits discussed:    Anesthesia Type: Epidural, CSE, Spinal, Gen ETT  Intra-op Monitoring Plan: Standard ASA Monitors  Post Op Pain Control Plan: multimodal analgesia and IV/PO Opioids PRN  Induction:  IV  Airway Plan: , Awake  Informed Consent: Informed consent signed with the Patient and all parties understand the risks and agree with anesthesia plan.  All questions answered. Patient consented to blood products? No  ASA Score: 2  Day of Surgery Review of History & Physical: H&P Update referred to the surgeon/provider.    Ready For Surgery From Anesthesia Perspective.     .           [1]   Patient Active Problem List  Diagnosis    Amenorrhea    Left hip pain    Sciatic pain, left    Supervision of normal first pregnancy, antepartum   [2]   No current facility-administered medications on file prior to encounter.     Current Outpatient Medications on File Prior to Encounter   Medication  Sig Dispense Refill    docusate sodium (COLACE) 100 MG capsule Take 1 capsule (100 mg total) by mouth 2 (two) times daily. 60 capsule 11    hydrocortisone-pramoxine (PROCTOFOAM HC) rectal foam Place 1 applicator rectally 3 (three) times daily. 90 applicator 0    ondansetron (ZOFRAN) 4 MG tablet Take 1 tablet (4 mg total) by mouth every 6 (six) hours. 30 tablet 1    ondansetron (ZOFRAN-ODT) 8 MG TbDL Take 1 tablet (8 mg total) by mouth every 6 (six) hours as needed (anusea). 30 tablet 1    PNV,calcium 72/iron/folic acid (PRENATAL VITAMIN) Tab Take 1 tablet by mouth once daily. 30 tablet 11    polyethylene glycol (GLYCOLAX) 17 gram/dose powder Take one capful daily for 5-7 days until normal bowel movement.  Then 1-2 times per week for remainder of pregnancy. 595 g 3

## 2025-05-21 VITALS — SYSTOLIC BLOOD PRESSURE: 107 MMHG | DIASTOLIC BLOOD PRESSURE: 67 MMHG | OXYGEN SATURATION: 98 % | HEART RATE: 80 BPM

## 2025-05-21 PROBLEM — O99.113 BENIGN GESTATIONAL THROMBOCYTOPENIA IN THIRD TRIMESTER: Status: ACTIVE | Noted: 2025-05-21

## 2025-05-21 PROBLEM — Z34.90 ENCOUNTER FOR INDUCTION OF LABOR: Status: RESOLVED | Noted: 2025-05-20 | Resolved: 2025-05-21

## 2025-05-21 PROBLEM — Z98.891 STATUS POST PRIMARY LOW TRANSVERSE CESAREAN SECTION: Status: ACTIVE | Noted: 2025-05-21

## 2025-05-21 PROBLEM — D69.6 BENIGN GESTATIONAL THROMBOCYTOPENIA IN THIRD TRIMESTER: Status: ACTIVE | Noted: 2025-05-21

## 2025-05-21 LAB
CREAT UR-MCNC: 100.9 MG/DL (ref 15–325)
PROT UR-MCNC: 10 MG/DL
PROT/CREAT UR: 0.1 MG/G{CREAT}

## 2025-05-21 PROCEDURE — 27200710 HC EPIDURAL INFUSION PUMP SET: Performed by: STUDENT IN AN ORGANIZED HEALTH CARE EDUCATION/TRAINING PROGRAM

## 2025-05-21 PROCEDURE — 51702 INSERT TEMP BLADDER CATH: CPT

## 2025-05-21 PROCEDURE — 63600175 PHARM REV CODE 636 W HCPCS

## 2025-05-21 PROCEDURE — 27000181 HC CABLE, IUPC

## 2025-05-21 PROCEDURE — 59510 CESAREAN DELIVERY: CPT | Mod: ,,, | Performed by: OBSTETRICS & GYNECOLOGY

## 2025-05-21 PROCEDURE — 37000009 HC ANESTHESIA EA ADD 15 MINS: Performed by: OBSTETRICS & GYNECOLOGY

## 2025-05-21 PROCEDURE — 25000003 PHARM REV CODE 250

## 2025-05-21 PROCEDURE — 10907ZC DRAINAGE OF AMNIOTIC FLUID, THERAPEUTIC FROM PRODUCTS OF CONCEPTION, VIA NATURAL OR ARTIFICIAL OPENING: ICD-10-PCS | Performed by: OBSTETRICS & GYNECOLOGY

## 2025-05-21 PROCEDURE — 36004724 HC OB OR TIME LEV III - 1ST 15 MIN: Performed by: OBSTETRICS & GYNECOLOGY

## 2025-05-21 PROCEDURE — 84156 ASSAY OF PROTEIN URINE: CPT

## 2025-05-21 PROCEDURE — 11000001 HC ACUTE MED/SURG PRIVATE ROOM

## 2025-05-21 PROCEDURE — 62326 NJX INTERLAMINAR LMBR/SAC: CPT

## 2025-05-21 PROCEDURE — 63600175 PHARM REV CODE 636 W HCPCS: Mod: JZ,TB

## 2025-05-21 PROCEDURE — 71000033 HC RECOVERY, INTIAL HOUR: Performed by: OBSTETRICS & GYNECOLOGY

## 2025-05-21 PROCEDURE — 37000008 HC ANESTHESIA 1ST 15 MINUTES: Performed by: OBSTETRICS & GYNECOLOGY

## 2025-05-21 PROCEDURE — 36004725 HC OB OR TIME LEV III - EA ADD 15 MIN: Performed by: OBSTETRICS & GYNECOLOGY

## 2025-05-21 PROCEDURE — 71000039 HC RECOVERY, EACH ADD'L HOUR: Performed by: OBSTETRICS & GYNECOLOGY

## 2025-05-21 PROCEDURE — C1751 CATH, INF, PER/CENT/MIDLINE: HCPCS | Performed by: STUDENT IN AN ORGANIZED HEALTH CARE EDUCATION/TRAINING PROGRAM

## 2025-05-21 RX ORDER — OXYCODONE HYDROCHLORIDE 10 MG/1
10 TABLET ORAL EVERY 4 HOURS PRN
Status: DISCONTINUED | OUTPATIENT
Start: 2025-05-21 | End: 2025-05-21 | Stop reason: SDUPTHER

## 2025-05-21 RX ORDER — MISOPROSTOL 200 UG/1
800 TABLET ORAL ONCE AS NEEDED
Status: DISCONTINUED | OUTPATIENT
Start: 2025-05-21 | End: 2025-05-21 | Stop reason: SDUPTHER

## 2025-05-21 RX ORDER — KETOROLAC TROMETHAMINE 30 MG/ML
30 INJECTION, SOLUTION INTRAMUSCULAR; INTRAVENOUS EVERY 6 HOURS
Status: DISCONTINUED | OUTPATIENT
Start: 2025-05-21 | End: 2025-05-21 | Stop reason: SDUPTHER

## 2025-05-21 RX ORDER — DIPHENHYDRAMINE HCL 25 MG
25 CAPSULE ORAL EVERY 4 HOURS PRN
Status: DISCONTINUED | OUTPATIENT
Start: 2025-05-21 | End: 2025-05-24 | Stop reason: HOSPADM

## 2025-05-21 RX ORDER — OXYTOCIN-SODIUM CHLORIDE 0.9% IV SOLN 30 UNIT/500ML 30-0.9/5 UT/ML-%
10 SOLUTION INTRAVENOUS ONCE AS NEEDED
Status: DISCONTINUED | OUTPATIENT
Start: 2025-05-21 | End: 2025-05-24 | Stop reason: HOSPADM

## 2025-05-21 RX ORDER — CEFAZOLIN SODIUM 1 G/3ML
INJECTION, POWDER, FOR SOLUTION INTRAMUSCULAR; INTRAVENOUS
Status: DISCONTINUED | OUTPATIENT
Start: 2025-05-21 | End: 2025-05-21

## 2025-05-21 RX ORDER — OXYCODONE HYDROCHLORIDE 10 MG/1
10 TABLET ORAL EVERY 4 HOURS PRN
Status: DISCONTINUED | OUTPATIENT
Start: 2025-05-21 | End: 2025-05-24 | Stop reason: HOSPADM

## 2025-05-21 RX ORDER — SIMETHICONE 80 MG
1 TABLET,CHEWABLE ORAL EVERY 6 HOURS PRN
Status: DISCONTINUED | OUTPATIENT
Start: 2025-05-21 | End: 2025-05-23

## 2025-05-21 RX ORDER — PROCHLORPERAZINE EDISYLATE 5 MG/ML
5 INJECTION INTRAMUSCULAR; INTRAVENOUS EVERY 6 HOURS PRN
Status: DISCONTINUED | OUTPATIENT
Start: 2025-05-21 | End: 2025-05-23

## 2025-05-21 RX ORDER — BISACODYL 10 MG/1
10 SUPPOSITORY RECTAL ONCE AS NEEDED
Status: DISCONTINUED | OUTPATIENT
Start: 2025-05-21 | End: 2025-05-24 | Stop reason: HOSPADM

## 2025-05-21 RX ORDER — SODIUM CHLORIDE, SODIUM LACTATE, POTASSIUM CHLORIDE, CALCIUM CHLORIDE 600; 310; 30; 20 MG/100ML; MG/100ML; MG/100ML; MG/100ML
INJECTION, SOLUTION INTRAVENOUS CONTINUOUS PRN
Status: DISCONTINUED | OUTPATIENT
Start: 2025-05-21 | End: 2025-05-21

## 2025-05-21 RX ORDER — OXYCODONE HYDROCHLORIDE 5 MG/1
5 TABLET ORAL EVERY 4 HOURS PRN
Status: DISCONTINUED | OUTPATIENT
Start: 2025-05-21 | End: 2025-05-24 | Stop reason: HOSPADM

## 2025-05-21 RX ORDER — DEXAMETHASONE SODIUM PHOSPHATE 4 MG/ML
INJECTION, SOLUTION INTRA-ARTICULAR; INTRALESIONAL; INTRAMUSCULAR; INTRAVENOUS; SOFT TISSUE
Status: DISCONTINUED | OUTPATIENT
Start: 2025-05-21 | End: 2025-05-21

## 2025-05-21 RX ORDER — METHYLERGONOVINE MALEATE 0.2 MG/ML
200 INJECTION INTRAVENOUS ONCE AS NEEDED
Status: DISCONTINUED | OUTPATIENT
Start: 2025-05-21 | End: 2025-05-24 | Stop reason: HOSPADM

## 2025-05-21 RX ORDER — IBUPROFEN 400 MG/1
800 TABLET, FILM COATED ORAL EVERY 8 HOURS
Status: DISCONTINUED | OUTPATIENT
Start: 2025-05-22 | End: 2025-05-24 | Stop reason: HOSPADM

## 2025-05-21 RX ORDER — MISOPROSTOL 200 UG/1
800 TABLET ORAL ONCE AS NEEDED
Status: DISCONTINUED | OUTPATIENT
Start: 2025-05-21 | End: 2025-05-24 | Stop reason: HOSPADM

## 2025-05-21 RX ORDER — CARBOPROST TROMETHAMINE 250 UG/ML
250 INJECTION, SOLUTION INTRAMUSCULAR
Status: DISCONTINUED | OUTPATIENT
Start: 2025-05-21 | End: 2025-05-24 | Stop reason: HOSPADM

## 2025-05-21 RX ORDER — FENTANYL/BUPIVACAINE/NS/PF 2MCG/ML-.1
PLASTIC BAG, INJECTION (ML) INJECTION
Status: COMPLETED
Start: 2025-05-21 | End: 2025-05-21

## 2025-05-21 RX ORDER — LIDOCAINE HYDROCHLORIDE AND EPINEPHRINE 15; 5 MG/ML; UG/ML
INJECTION, SOLUTION EPIDURAL
Status: DISCONTINUED | OUTPATIENT
Start: 2025-05-21 | End: 2025-05-21

## 2025-05-21 RX ORDER — ADHESIVE BANDAGE
30 BANDAGE TOPICAL 2 TIMES DAILY PRN
Status: DISCONTINUED | OUTPATIENT
Start: 2025-05-22 | End: 2025-05-24 | Stop reason: HOSPADM

## 2025-05-21 RX ORDER — SODIUM CITRATE AND CITRIC ACID MONOHYDRATE 334; 500 MG/5ML; MG/5ML
30 SOLUTION ORAL ONCE
Status: COMPLETED | OUTPATIENT
Start: 2025-05-21 | End: 2025-05-21

## 2025-05-21 RX ORDER — ONDANSETRON 8 MG/1
8 TABLET, ORALLY DISINTEGRATING ORAL EVERY 8 HOURS PRN
Status: DISCONTINUED | OUTPATIENT
Start: 2025-05-21 | End: 2025-05-24 | Stop reason: HOSPADM

## 2025-05-21 RX ORDER — MORPHINE SULFATE 0.5 MG/ML
INJECTION, SOLUTION EPIDURAL; INTRATHECAL; INTRAVENOUS
Status: DISCONTINUED | OUTPATIENT
Start: 2025-05-21 | End: 2025-05-21

## 2025-05-21 RX ORDER — FENTANYL/BUPIVACAINE/NS/PF 2MCG/ML-.1
PLASTIC BAG, INJECTION (ML) INJECTION CONTINUOUS
Refills: 0 | Status: DISCONTINUED | OUTPATIENT
Start: 2025-05-21 | End: 2025-05-21

## 2025-05-21 RX ORDER — ACETAMINOPHEN 325 MG/1
650 TABLET ORAL EVERY 6 HOURS
Status: COMPLETED | OUTPATIENT
Start: 2025-05-21 | End: 2025-05-22

## 2025-05-21 RX ORDER — ONDANSETRON HYDROCHLORIDE 2 MG/ML
INJECTION, SOLUTION INTRAVENOUS
Status: DISCONTINUED | OUTPATIENT
Start: 2025-05-21 | End: 2025-05-21

## 2025-05-21 RX ORDER — KETOROLAC TROMETHAMINE 30 MG/ML
30 INJECTION, SOLUTION INTRAMUSCULAR; INTRAVENOUS EVERY 6 HOURS
Status: DISCONTINUED | OUTPATIENT
Start: 2025-05-21 | End: 2025-05-21

## 2025-05-21 RX ORDER — FENTANYL/BUPIVACAINE/NS/PF 2MCG/ML-.1
PLASTIC BAG, INJECTION (ML) INJECTION
Status: DISCONTINUED | OUTPATIENT
Start: 2025-05-21 | End: 2025-05-21

## 2025-05-21 RX ORDER — KETOROLAC TROMETHAMINE 30 MG/ML
INJECTION, SOLUTION INTRAMUSCULAR; INTRAVENOUS
Status: DISCONTINUED | OUTPATIENT
Start: 2025-05-21 | End: 2025-05-21

## 2025-05-21 RX ORDER — DOCUSATE SODIUM 100 MG/1
200 CAPSULE, LIQUID FILLED ORAL 2 TIMES DAILY
Status: DISCONTINUED | OUTPATIENT
Start: 2025-05-21 | End: 2025-05-24 | Stop reason: HOSPADM

## 2025-05-21 RX ORDER — IBUPROFEN 400 MG/1
800 TABLET, FILM COATED ORAL EVERY 8 HOURS
Status: DISCONTINUED | OUTPATIENT
Start: 2025-05-22 | End: 2025-05-21

## 2025-05-21 RX ORDER — OXYCODONE HYDROCHLORIDE 5 MG/1
5 TABLET ORAL EVERY 4 HOURS PRN
Status: DISCONTINUED | OUTPATIENT
Start: 2025-05-21 | End: 2025-05-21 | Stop reason: SDUPTHER

## 2025-05-21 RX ORDER — PHENYLEPHRINE HYDROCHLORIDE 10 MG/ML
INJECTION INTRAVENOUS
Status: DISCONTINUED | OUTPATIENT
Start: 2025-05-21 | End: 2025-05-21

## 2025-05-21 RX ORDER — LIDOCAINE HCL/EPINEPHRINE/PF 2%-1:200K
VIAL (ML) INJECTION
Status: DISCONTINUED | OUTPATIENT
Start: 2025-05-21 | End: 2025-05-21

## 2025-05-21 RX ORDER — FAMOTIDINE 10 MG/ML
20 INJECTION, SOLUTION INTRAVENOUS ONCE
Status: COMPLETED | OUTPATIENT
Start: 2025-05-21 | End: 2025-05-21

## 2025-05-21 RX ORDER — TRANEXAMIC ACID 10 MG/ML
1000 INJECTION, SOLUTION INTRAVENOUS EVERY 30 MIN PRN
Status: DISCONTINUED | OUTPATIENT
Start: 2025-05-21 | End: 2025-05-24 | Stop reason: HOSPADM

## 2025-05-21 RX ORDER — ACETAMINOPHEN 325 MG/1
650 TABLET ORAL EVERY 6 HOURS
Status: DISCONTINUED | OUTPATIENT
Start: 2025-05-21 | End: 2025-05-21 | Stop reason: SDUPTHER

## 2025-05-21 RX ORDER — AMOXICILLIN 250 MG
1 CAPSULE ORAL NIGHTLY PRN
Status: DISCONTINUED | OUTPATIENT
Start: 2025-05-21 | End: 2025-05-24 | Stop reason: HOSPADM

## 2025-05-21 RX ORDER — DIPHENOXYLATE HYDROCHLORIDE AND ATROPINE SULFATE 2.5; .025 MG/1; MG/1
2 TABLET ORAL EVERY 6 HOURS PRN
Status: DISCONTINUED | OUTPATIENT
Start: 2025-05-21 | End: 2025-05-24 | Stop reason: HOSPADM

## 2025-05-21 RX ORDER — METOCLOPRAMIDE HYDROCHLORIDE 5 MG/ML
10 INJECTION INTRAMUSCULAR; INTRAVENOUS ONCE
Status: DISCONTINUED | OUTPATIENT
Start: 2025-05-21 | End: 2025-05-21

## 2025-05-21 RX ORDER — SODIUM CHLORIDE 0.9 % (FLUSH) 0.9 %
10 SYRINGE (ML) INJECTION
Status: DISCONTINUED | OUTPATIENT
Start: 2025-05-21 | End: 2025-05-24 | Stop reason: HOSPADM

## 2025-05-21 RX ORDER — FENTANYL CITRATE 50 UG/ML
INJECTION, SOLUTION INTRAMUSCULAR; INTRAVENOUS
Status: DISCONTINUED | OUTPATIENT
Start: 2025-05-21 | End: 2025-05-21

## 2025-05-21 RX ORDER — OXYTOCIN-SODIUM CHLORIDE 0.9% IV SOLN 30 UNIT/500ML 30-0.9/5 UT/ML-%
95 SOLUTION INTRAVENOUS CONTINUOUS PRN
Status: DISCONTINUED | OUTPATIENT
Start: 2025-05-21 | End: 2025-05-21

## 2025-05-21 RX ORDER — OXYTOCIN 10 [USP'U]/ML
INJECTION, SOLUTION INTRAMUSCULAR; INTRAVENOUS
Status: DISCONTINUED | OUTPATIENT
Start: 2025-05-21 | End: 2025-05-21

## 2025-05-21 RX ORDER — OXYTOCIN 10 [USP'U]/ML
10 INJECTION, SOLUTION INTRAMUSCULAR; INTRAVENOUS ONCE AS NEEDED
Status: DISCONTINUED | OUTPATIENT
Start: 2025-05-21 | End: 2025-05-21 | Stop reason: SDUPTHER

## 2025-05-21 RX ORDER — ONDANSETRON HYDROCHLORIDE 2 MG/ML
4 INJECTION, SOLUTION INTRAVENOUS EVERY 6 HOURS PRN
Status: ACTIVE | OUTPATIENT
Start: 2025-05-21 | End: 2025-05-22

## 2025-05-21 RX ORDER — DEXMEDETOMIDINE HYDROCHLORIDE 100 UG/ML
INJECTION, SOLUTION INTRAVENOUS
Status: DISCONTINUED | OUTPATIENT
Start: 2025-05-21 | End: 2025-05-21

## 2025-05-21 RX ORDER — OXYTOCIN-SODIUM CHLORIDE 0.9% IV SOLN 30 UNIT/500ML 30-0.9/5 UT/ML-%
95 SOLUTION INTRAVENOUS ONCE AS NEEDED
Status: DISCONTINUED | OUTPATIENT
Start: 2025-05-21 | End: 2025-05-21 | Stop reason: SDUPTHER

## 2025-05-21 RX ORDER — PRENATAL WITH FERROUS FUM AND FOLIC ACID 3080; 920; 120; 400; 22; 1.84; 3; 20; 10; 1; 12; 200; 27; 25; 2 [IU]/1; [IU]/1; MG/1; [IU]/1; MG/1; MG/1; MG/1; MG/1; MG/1; MG/1; UG/1; MG/1; MG/1; MG/1; MG/1
1 TABLET ORAL DAILY
Status: DISCONTINUED | OUTPATIENT
Start: 2025-05-22 | End: 2025-05-24 | Stop reason: HOSPADM

## 2025-05-21 RX ADMIN — LIDOCAINE HYDROCHLORIDE,EPINEPHRINE BITARTRATE 2 ML: 20; .005 INJECTION, SOLUTION EPIDURAL; INFILTRATION; INTRACAUDAL; PERINEURAL at 02:05

## 2025-05-21 RX ADMIN — SODIUM CHLORIDE, POTASSIUM CHLORIDE, SODIUM LACTATE AND CALCIUM CHLORIDE 500 ML: 600; 310; 30; 20 INJECTION, SOLUTION INTRAVENOUS at 04:05

## 2025-05-21 RX ADMIN — FENTANYL CITRATE 100 MCG: 50 INJECTION, SOLUTION INTRAMUSCULAR; INTRAVENOUS at 02:05

## 2025-05-21 RX ADMIN — CEFAZOLIN 2 G: 330 INJECTION, POWDER, FOR SOLUTION INTRAMUSCULAR; INTRAVENOUS at 02:05

## 2025-05-21 RX ADMIN — ONDANSETRON HYDROCHLORIDE 4 MG: 2 INJECTION INTRAMUSCULAR; INTRAVENOUS at 02:05

## 2025-05-21 RX ADMIN — PHENYLEPHRINE HYDROCHLORIDE 100 MCG: 10 INJECTION INTRAVENOUS at 03:05

## 2025-05-21 RX ADMIN — ACETAMINOPHEN 650 MG: 325 TABLET, FILM COATED ORAL at 09:05

## 2025-05-21 RX ADMIN — OXYTOCIN 25 UNITS: 10 INJECTION, SOLUTION INTRAMUSCULAR; INTRAVENOUS at 02:05

## 2025-05-21 RX ADMIN — HYDROCORTISONE: 25 CREAM TOPICAL at 08:05

## 2025-05-21 RX ADMIN — DEXMEDETOMIDINE HYDROCHLORIDE 8 MCG: 100 INJECTION, SOLUTION INTRAVENOUS at 02:05

## 2025-05-21 RX ADMIN — FAMOTIDINE 20 MG: 10 INJECTION, SOLUTION INTRAVENOUS at 01:05

## 2025-05-21 RX ADMIN — FENTANYL CITRATE 8 ML/HR: 50 INJECTION INTRAMUSCULAR; INTRAVENOUS at 04:05

## 2025-05-21 RX ADMIN — PHENYLEPHRINE HYDROCHLORIDE 50 MCG: 10 INJECTION INTRAVENOUS at 02:05

## 2025-05-21 RX ADMIN — DOCUSATE SODIUM 200 MG: 100 CAPSULE, LIQUID FILLED ORAL at 07:05

## 2025-05-21 RX ADMIN — KETOROLAC TROMETHAMINE 30 MG: 30 INJECTION, SOLUTION INTRAMUSCULAR; INTRAVENOUS at 03:05

## 2025-05-21 RX ADMIN — LIDOCAINE HYDROCHLORIDE,EPINEPHRINE BITARTRATE 3 ML: 20; .005 INJECTION, SOLUTION EPIDURAL; INFILTRATION; INTRACAUDAL; PERINEURAL at 02:05

## 2025-05-21 RX ADMIN — FENTANYL CITRATE 5 ML: 50 INJECTION INTRAMUSCULAR; INTRAVENOUS at 04:05

## 2025-05-21 RX ADMIN — SODIUM CHLORIDE: 9 INJECTION, SOLUTION INTRAVENOUS at 01:05

## 2025-05-21 RX ADMIN — SODIUM CHLORIDE, SODIUM LACTATE, POTASSIUM CHLORIDE, AND CALCIUM CHLORIDE: 600; 310; 30; 20 INJECTION, SOLUTION INTRAVENOUS at 02:05

## 2025-05-21 RX ADMIN — AZITHROMYCIN MONOHYDRATE 500 MG: 500 INJECTION, POWDER, LYOPHILIZED, FOR SOLUTION INTRAVENOUS at 01:05

## 2025-05-21 RX ADMIN — ONDANSETRON 8 MG: 8 TABLET, ORALLY DISINTEGRATING ORAL at 04:05

## 2025-05-21 RX ADMIN — LIDOCAINE HYDROCHLORIDE,EPINEPHRINE BITARTRATE 3 ML: 15; .005 INJECTION, SOLUTION EPIDURAL; INFILTRATION; INTRACAUDAL; PERINEURAL at 04:05

## 2025-05-21 RX ADMIN — PHENYLEPHRINE HYDROCHLORIDE 100 MCG: 10 INJECTION INTRAVENOUS at 02:05

## 2025-05-21 RX ADMIN — Medication 1.5 MG: at 03:05

## 2025-05-21 RX ADMIN — DEXAMETHASONE SODIUM PHOSPHATE 4 MG: 4 INJECTION, SOLUTION INTRAMUSCULAR; INTRAVENOUS at 02:05

## 2025-05-21 RX ADMIN — SODIUM CITRATE AND CITRIC ACID MONOHYDRATE 30 ML: 500; 334 SOLUTION ORAL at 01:05

## 2025-05-21 RX ADMIN — IBUPROFEN 800 MG: 400 TABLET, FILM COATED ORAL at 11:05

## 2025-05-21 NOTE — TRANSFER OF CARE
"Anesthesia Transfer of Care Note    Patient: Penelope Velez    Procedure(s) Performed: * No procedures listed *    Patient location: PACU    Anesthesia Type: general    Transport from OR: Transported from OR on 6-10 L/min O2 by face mask with adequate spontaneous ventilation    Post pain: adequate analgesia    Post assessment: no apparent anesthetic complications    Post vital signs: stable    Level of consciousness: awake    Nausea/Vomiting: no nausea/vomiting    Complications: none    Transfer of care protocol was followed      Last vitals: Visit Vitals  BP (!) 122/52   Pulse 100   Temp 36.7 °C (98 °F) (Oral)   Resp 18   Ht 5' 7" (1.702 m)   Wt 102.1 kg (225 lb 1.4 oz)   SpO2 100%   Breastfeeding No   BMI 35.25 kg/m²     "

## 2025-05-21 NOTE — ANESTHESIA PROCEDURE NOTES
Epidural    Patient location during procedure: OB   Reason for block: primary anesthetic   Reason for block: labor analgesia requested by patient and obstetrician  Diagnosis: IUP   Start time: 5/21/2025 4:26 AM  Timeout: 5/21/2025 4:25 AM  End time: 5/21/2025 4:45 AM  Surgery related to: Vaginal Delivery    Staffing  Performing Provider: Nimco Herman MD  Authorizing Provider: Eliza Love MD    Staffing  Performed by: Nimco Herman MD  Authorized by: Eliza Love MD        Preanesthetic Checklist  Completed: patient identified, IV checked, site marked, risks and benefits discussed, surgical consent, monitors and equipment checked, pre-op evaluation, timeout performed, anesthesia consent given, hand hygiene performed and patient being monitored  Preparation  Patient position: sitting  Prep: ChloraPrep  Patient monitoring: Pulse Ox  Reason for block: primary anesthetic   Epidural  Skin Anesthetic: lidocaine 1%  Skin Wheal: 3 mL  Administration type: continuous  Approach: midline  Interspace: L3-4    Injection technique: KIKA saline  Needle and Epidural Catheter  Needle type: Tuohy   Needle gauge: 17  Needle length: 3.5 inches  Needle insertion depth: 8 cm  Catheter type: [employer]  Catheter size: 19 G  Catheter at skin depth: 12 cm  Insertion Attempts: 1  Test dose: 3 mL of lidocaine 1.5% with Epi 1-to-200,000  Additional Documentation: incremental injection, negative aspiration for heme and CSF, no paresthesia on injection, no signs/symptoms of IV or SA injection, no significant pain on injection and no significant complaints from patient  Needle localization: anatomical landmarks  Medications:  Volume per aspiration: 5 mL  Time between aspirations: 5 minutes   Assessment  Ease of block: easy  Patient's tolerance of the procedure: comfortable throughout block and no complaints No inadvertent dural puncture with Tuohy.  Dural puncture performed with spinal needle.

## 2025-05-22 ENCOUNTER — RESEARCH ENCOUNTER (OUTPATIENT)
Dept: RESEARCH | Facility: OTHER | Age: 30
End: 2025-05-22
Payer: OTHER GOVERNMENT

## 2025-05-22 ENCOUNTER — ANESTHESIA (OUTPATIENT)
Dept: OBSTETRICS AND GYNECOLOGY | Facility: CLINIC | Age: 30
End: 2025-05-22

## 2025-05-22 ENCOUNTER — ANESTHESIA EVENT (OUTPATIENT)
Dept: OBSTETRICS AND GYNECOLOGY | Facility: CLINIC | Age: 30
End: 2025-05-22

## 2025-05-22 ENCOUNTER — PATIENT MESSAGE (OUTPATIENT)
Dept: RESEARCH | Facility: OTHER | Age: 30
End: 2025-05-22
Payer: OTHER GOVERNMENT

## 2025-05-22 PROBLEM — D62 ABLA (ACUTE BLOOD LOSS ANEMIA): Status: ACTIVE | Noted: 2025-05-22

## 2025-05-22 LAB
ABSOLUTE EOSINOPHIL (OHS): 0.05 K/UL
ABSOLUTE MONOCYTE (OHS): 0.73 K/UL (ref 0.3–1)
ABSOLUTE NEUTROPHIL COUNT (OHS): 11.86 K/UL (ref 1.8–7.7)
BASOPHILS # BLD AUTO: 0.02 K/UL
BASOPHILS NFR BLD AUTO: 0.1 %
ERYTHROCYTE [DISTWIDTH] IN BLOOD BY AUTOMATED COUNT: 13.3 % (ref 11.5–14.5)
HCT VFR BLD AUTO: 29.3 % (ref 37–48.5)
HGB BLD-MCNC: 10 GM/DL (ref 12–16)
IMM GRANULOCYTES # BLD AUTO: 0.07 K/UL (ref 0–0.04)
IMM GRANULOCYTES NFR BLD AUTO: 0.5 % (ref 0–0.5)
LYMPHOCYTES # BLD AUTO: 1.51 K/UL (ref 1–4.8)
MCH RBC QN AUTO: 30.1 PG (ref 27–31)
MCHC RBC AUTO-ENTMCNC: 34.1 G/DL (ref 32–36)
MCV RBC AUTO: 88 FL (ref 82–98)
NUCLEATED RBC (/100WBC) (OHS): 0 /100 WBC
PLATELET # BLD AUTO: 121 K/UL (ref 150–450)
PMV BLD AUTO: 11.6 FL (ref 9.2–12.9)
RBC # BLD AUTO: 3.32 M/UL (ref 4–5.4)
RELATIVE EOSINOPHIL (OHS): 0.4 %
RELATIVE LYMPHOCYTE (OHS): 10.6 % (ref 18–48)
RELATIVE MONOCYTE (OHS): 5.1 % (ref 4–15)
RELATIVE NEUTROPHIL (OHS): 83.3 % (ref 38–73)
WBC # BLD AUTO: 14.24 K/UL (ref 3.9–12.7)

## 2025-05-22 PROCEDURE — 25000003 PHARM REV CODE 250

## 2025-05-22 PROCEDURE — 85025 COMPLETE CBC W/AUTO DIFF WBC: CPT | Performed by: OBSTETRICS & GYNECOLOGY

## 2025-05-22 PROCEDURE — 36415 COLL VENOUS BLD VENIPUNCTURE: CPT | Performed by: OBSTETRICS & GYNECOLOGY

## 2025-05-22 PROCEDURE — 11000001 HC ACUTE MED/SURG PRIVATE ROOM

## 2025-05-22 RX ORDER — FERROUS SULFATE 325(65) MG
325 TABLET, DELAYED RELEASE (ENTERIC COATED) ORAL DAILY
Qty: 60 TABLET | Refills: 2 | Status: SHIPPED | OUTPATIENT
Start: 2025-05-22 | End: 2025-05-22

## 2025-05-22 RX ORDER — IBUPROFEN 600 MG/1
600 TABLET, FILM COATED ORAL EVERY 6 HOURS PRN
Qty: 60 TABLET | Refills: 0 | Status: SHIPPED | OUTPATIENT
Start: 2025-05-22 | End: 2025-05-22

## 2025-05-22 RX ORDER — ACETAMINOPHEN 325 MG/1
650 TABLET ORAL EVERY 6 HOURS
Status: COMPLETED | OUTPATIENT
Start: 2025-05-23 | End: 2025-05-23

## 2025-05-22 RX ORDER — LANOLIN ALCOHOL/MO/W.PET/CERES
1 CREAM (GRAM) TOPICAL DAILY
Status: DISCONTINUED | OUTPATIENT
Start: 2025-05-22 | End: 2025-05-24 | Stop reason: HOSPADM

## 2025-05-22 RX ORDER — IBUPROFEN 600 MG/1
600 TABLET, FILM COATED ORAL EVERY 6 HOURS PRN
Qty: 60 TABLET | Refills: 0 | Status: SHIPPED | OUTPATIENT
Start: 2025-05-22

## 2025-05-22 RX ORDER — ACETAMINOPHEN 325 MG/1
650 TABLET ORAL EVERY 6 HOURS
Qty: 60 TABLET | Refills: 1 | Status: SHIPPED | OUTPATIENT
Start: 2025-05-22

## 2025-05-22 RX ORDER — FERROUS SULFATE 325(65) MG
325 TABLET, DELAYED RELEASE (ENTERIC COATED) ORAL DAILY
Qty: 60 TABLET | Refills: 2 | Status: SHIPPED | OUTPATIENT
Start: 2025-05-22 | End: 2025-06-05

## 2025-05-22 RX ORDER — ACETAMINOPHEN 325 MG/1
650 TABLET ORAL EVERY 6 HOURS
Qty: 60 TABLET | Refills: 1 | Status: SHIPPED | OUTPATIENT
Start: 2025-05-22 | End: 2025-05-22

## 2025-05-22 RX ORDER — OXYCODONE HYDROCHLORIDE 5 MG/1
5 TABLET ORAL EVERY 4 HOURS PRN
Qty: 20 TABLET | Refills: 0 | Status: SHIPPED | OUTPATIENT
Start: 2025-05-22 | End: 2025-05-22

## 2025-05-22 RX ORDER — OXYCODONE HYDROCHLORIDE 5 MG/1
5 TABLET ORAL EVERY 4 HOURS PRN
Qty: 20 TABLET | Refills: 0 | Status: SHIPPED | OUTPATIENT
Start: 2025-05-22 | End: 2025-06-05 | Stop reason: SDUPTHER

## 2025-05-22 RX ORDER — DOCUSATE SODIUM 100 MG/1
100 CAPSULE, LIQUID FILLED ORAL 2 TIMES DAILY PRN
Qty: 60 CAPSULE | Refills: 0 | Status: SHIPPED | OUTPATIENT
Start: 2025-05-22 | End: 2025-05-22

## 2025-05-22 RX ORDER — DOCUSATE SODIUM 100 MG/1
100 CAPSULE, LIQUID FILLED ORAL 2 TIMES DAILY PRN
Qty: 60 CAPSULE | Refills: 0 | Status: SHIPPED | OUTPATIENT
Start: 2025-05-22

## 2025-05-22 RX ADMIN — ACETAMINOPHEN 650 MG: 325 TABLET, FILM COATED ORAL at 05:05

## 2025-05-22 RX ADMIN — IBUPROFEN 800 MG: 400 TABLET, FILM COATED ORAL at 04:05

## 2025-05-22 RX ADMIN — IBUPROFEN 800 MG: 400 TABLET, FILM COATED ORAL at 08:05

## 2025-05-22 RX ADMIN — DOCUSATE SODIUM 200 MG: 100 CAPSULE, LIQUID FILLED ORAL at 08:05

## 2025-05-22 RX ADMIN — OXYCODONE HYDROCHLORIDE 5 MG: 5 TABLET ORAL at 10:05

## 2025-05-22 RX ADMIN — SIMETHICONE 80 MG: 80 TABLET, CHEWABLE ORAL at 08:05

## 2025-05-22 RX ADMIN — ACETAMINOPHEN 650 MG: 325 TABLET, FILM COATED ORAL at 11:05

## 2025-05-22 RX ADMIN — ACETAMINOPHEN 650 MG: 325 TABLET, FILM COATED ORAL at 06:05

## 2025-05-22 RX ADMIN — FERROUS SULFATE TAB 325 MG (65 MG ELEMENTAL FE) 1 EACH: 325 (65 FE) TAB at 08:05

## 2025-05-22 RX ADMIN — OXYCODONE HYDROCHLORIDE 10 MG: 10 TABLET ORAL at 11:05

## 2025-05-22 RX ADMIN — PRENATAL VIT W/ FE FUMARATE-FA TAB 27-0.8 MG 1 TABLET: 27-0.8 TAB at 08:05

## 2025-05-22 RX ADMIN — OXYCODONE HYDROCHLORIDE 5 MG: 5 TABLET ORAL at 02:05

## 2025-05-22 RX ADMIN — OXYCODONE HYDROCHLORIDE 5 MG: 5 TABLET ORAL at 06:05

## 2025-05-22 NOTE — ANESTHESIA POSTPROCEDURE EVALUATION
Anesthesia Post Evaluation    Patient: Penelope Velez    Procedure(s) Performed: Procedure(s) (LRB):   SECTION (N/A)    Final Anesthesia Type: epidural      Patient location during evaluation: floor  Patient participation: Yes- Able to Participate  Level of consciousness: awake and alert  Post-procedure vital signs: reviewed and stable  Pain management: adequate  Airway patency: patent  PATRICIA mitigation strategies: Multimodal analgesia and Use of major conduction anesthesia (spinal/epidural) or peripheral nerve block  PONV status at discharge: No PONV  Anesthetic complications: no      Cardiovascular status: hemodynamically stable  Respiratory status: room air  Hydration status: euvolemic  Follow-up not needed.              Vitals Value Taken Time   /71 25 09:28   Temp 36.8 °C (98.3 °F) 25 09:28   Pulse 86 25 09:28   Resp 18 25 10:22   SpO2 96 % 25 04:08         Event Time   Out of Recovery 2025 17:58:00         Pain/Morena Score: Pain Rating Prior to Med Admin: 4 (2025 11:43 AM)  Pain Rating Post Med Admin: 3 (2025 12:20 PM)

## 2025-05-22 NOTE — PROGRESS NOTES
Informed Consent  Study: Feasibility of Remote Postpartum Blood Pressure Monitoring and Cardiovascular Education to Reduce Emergency Department Visits, Re-admissions and Adverse Cardiovascular Outcomes in Hypertensive Disorder of Pregnancy  IRB/Protocol #: 2023.192  Principle Investigator: Dr. Gimenez  Study Site: [facility]    Subject Number: 23    Date of Visit: 5/22/2025    Informed Consent:   Consenting was completed in private area using the most current IRB approved version of the ICF. Patient was given ample time to review consent prior to execution of ICF.     Person Obtaining Consent: : Adolfo Silver    Time of Consent: 14:10        Prior to the Informed Consent (IC) being signed, or any study protocol required data collection, testing, procedure, or intervention being performed, the following was done and/or discussed:?   Patient was given a copy of the IC for review??   Purpose of the study and qualifications to participate??   Study design, follow up schedule, and tests or procedures done at each visit?   Confidentiality and HIPAA Authorization for Release of Medical Records for the research trial/ subject's rights/research related injury?   Risk, Benefits, Alternative Treatments, Compensation and Costs?   Participation in the research trial is voluntary and patient may withdraw at anytime?   Contact information for study related questions?I have read this consent form, or it has been read to me.        Patient verbalizes understanding of the above: Yes?   Contact information for CRC and PI given to patient: Yes?   Patient able to adequately summarize: the purpose of the study, the risks associated with the study, and all procedures, testing, and follow-ups associated with the study: Yes?     Penelope Lizbeth Halikirby Velez signed the IRB approved version of informed consent form for the Remote Blood Pressure Monitoring research study.? Each page of the consent was reviewed with the  patient and patient's family and all questions answered satisfactorily. The patient signed the paper consent form and received a copy of same ([email or web address]). The original consent was scanned into electronic medical records (EPIC).

## 2025-05-23 PROCEDURE — 11000001 HC ACUTE MED/SURG PRIVATE ROOM

## 2025-05-23 PROCEDURE — 25000003 PHARM REV CODE 250

## 2025-05-23 PROCEDURE — 99231 SBSQ HOSP IP/OBS SF/LOW 25: CPT | Mod: ,,, | Performed by: OBSTETRICS & GYNECOLOGY

## 2025-05-23 PROCEDURE — 25000003 PHARM REV CODE 250: Performed by: OBSTETRICS & GYNECOLOGY

## 2025-05-23 RX ORDER — SIMETHICONE 80 MG
1 TABLET,CHEWABLE ORAL
Status: DISCONTINUED | OUTPATIENT
Start: 2025-05-23 | End: 2025-05-24 | Stop reason: HOSPADM

## 2025-05-23 RX ORDER — LIDOCAINE 50 MG/G
1 PATCH TOPICAL
Status: DISCONTINUED | OUTPATIENT
Start: 2025-05-23 | End: 2025-05-24 | Stop reason: HOSPADM

## 2025-05-23 RX ORDER — GABAPENTIN 300 MG/1
300 CAPSULE ORAL 3 TIMES DAILY
Qty: 90 CAPSULE | Refills: 11 | Status: SHIPPED | OUTPATIENT
Start: 2025-05-23 | End: 2025-05-24

## 2025-05-23 RX ORDER — GABAPENTIN 300 MG/1
300 CAPSULE ORAL 3 TIMES DAILY
Status: DISCONTINUED | OUTPATIENT
Start: 2025-05-23 | End: 2025-05-24 | Stop reason: HOSPADM

## 2025-05-23 RX ORDER — LIDOCAINE 50 MG/G
1 PATCH TOPICAL DAILY
Qty: 5 PATCH | Refills: 0 | Status: SHIPPED | OUTPATIENT
Start: 2025-05-23 | End: 2025-05-24

## 2025-05-23 RX ORDER — SIMETHICONE 80 MG
1 TABLET,CHEWABLE ORAL EVERY 6 HOURS
Status: DISCONTINUED | OUTPATIENT
Start: 2025-05-23 | End: 2025-05-23

## 2025-05-23 RX ADMIN — FERROUS SULFATE TAB 325 MG (65 MG ELEMENTAL FE) 1 EACH: 325 (65 FE) TAB at 08:05

## 2025-05-23 RX ADMIN — SIMETHICONE 80 MG: 80 TABLET, CHEWABLE ORAL at 03:05

## 2025-05-23 RX ADMIN — OXYCODONE HYDROCHLORIDE 10 MG: 10 TABLET ORAL at 06:05

## 2025-05-23 RX ADMIN — SIMETHICONE 80 MG: 80 TABLET, CHEWABLE ORAL at 09:05

## 2025-05-23 RX ADMIN — LIDOCAINE 1 PATCH: 50 PATCH TOPICAL at 12:05

## 2025-05-23 RX ADMIN — OXYCODONE HYDROCHLORIDE 10 MG: 10 TABLET ORAL at 03:05

## 2025-05-23 RX ADMIN — SIMETHICONE 80 MG: 80 TABLET, CHEWABLE ORAL at 08:05

## 2025-05-23 RX ADMIN — GABAPENTIN 300 MG: 300 CAPSULE ORAL at 08:05

## 2025-05-23 RX ADMIN — OXYCODONE HYDROCHLORIDE 10 MG: 10 TABLET ORAL at 08:05

## 2025-05-23 RX ADMIN — IBUPROFEN 800 MG: 400 TABLET, FILM COATED ORAL at 12:05

## 2025-05-23 RX ADMIN — ACETAMINOPHEN 650 MG: 325 TABLET, FILM COATED ORAL at 12:05

## 2025-05-23 RX ADMIN — OXYCODONE HYDROCHLORIDE 10 MG: 10 TABLET ORAL at 10:05

## 2025-05-23 RX ADMIN — ACETAMINOPHEN 650 MG: 325 TABLET, FILM COATED ORAL at 07:05

## 2025-05-23 RX ADMIN — DOCUSATE SODIUM 200 MG: 100 CAPSULE, LIQUID FILLED ORAL at 08:05

## 2025-05-23 RX ADMIN — IBUPROFEN 800 MG: 400 TABLET, FILM COATED ORAL at 03:05

## 2025-05-23 RX ADMIN — GABAPENTIN 300 MG: 300 CAPSULE ORAL at 11:05

## 2025-05-23 RX ADMIN — PRENATAL VIT W/ FE FUMARATE-FA TAB 27-0.8 MG 1 TABLET: 27-0.8 TAB at 08:05

## 2025-05-23 RX ADMIN — IBUPROFEN 800 MG: 400 TABLET, FILM COATED ORAL at 11:05

## 2025-05-23 RX ADMIN — GABAPENTIN 300 MG: 300 CAPSULE ORAL at 12:05

## 2025-05-23 RX ADMIN — IBUPROFEN 800 MG: 400 TABLET, FILM COATED ORAL at 08:05

## 2025-05-23 RX ADMIN — ACETAMINOPHEN 650 MG: 325 TABLET, FILM COATED ORAL at 06:05

## 2025-05-23 RX ADMIN — GABAPENTIN 300 MG: 300 CAPSULE ORAL at 03:05

## 2025-05-24 VITALS
OXYGEN SATURATION: 97 % | RESPIRATION RATE: 18 BRPM | HEART RATE: 96 BPM | DIASTOLIC BLOOD PRESSURE: 61 MMHG | WEIGHT: 225.06 LBS | SYSTOLIC BLOOD PRESSURE: 102 MMHG | TEMPERATURE: 98 F | HEIGHT: 67 IN | BODY MASS INDEX: 35.33 KG/M2

## 2025-05-24 PROCEDURE — 25000003 PHARM REV CODE 250

## 2025-05-24 PROCEDURE — 25000003 PHARM REV CODE 250: Performed by: OBSTETRICS & GYNECOLOGY

## 2025-05-24 RX ORDER — LIDOCAINE 50 MG/G
1 PATCH TOPICAL DAILY
Qty: 5 PATCH | Refills: 0 | Status: SHIPPED | OUTPATIENT
Start: 2025-05-24

## 2025-05-24 RX ORDER — GABAPENTIN 300 MG/1
300 CAPSULE ORAL 3 TIMES DAILY
Qty: 90 CAPSULE | Refills: 0 | Status: SHIPPED | OUTPATIENT
Start: 2025-05-24 | End: 2025-06-05

## 2025-05-24 RX ADMIN — GABAPENTIN 300 MG: 300 CAPSULE ORAL at 08:05

## 2025-05-24 RX ADMIN — LIDOCAINE 1 PATCH: 50 PATCH TOPICAL at 01:05

## 2025-05-24 RX ADMIN — FERROUS SULFATE TAB 325 MG (65 MG ELEMENTAL FE) 1 EACH: 325 (65 FE) TAB at 08:05

## 2025-05-24 RX ADMIN — OXYCODONE HYDROCHLORIDE 5 MG: 5 TABLET ORAL at 09:05

## 2025-05-24 RX ADMIN — SIMETHICONE 80 MG: 80 TABLET, CHEWABLE ORAL at 12:05

## 2025-05-24 RX ADMIN — OXYCODONE HYDROCHLORIDE 10 MG: 10 TABLET ORAL at 01:05

## 2025-05-24 RX ADMIN — DOCUSATE SODIUM 200 MG: 100 CAPSULE, LIQUID FILLED ORAL at 08:05

## 2025-05-24 RX ADMIN — IBUPROFEN 800 MG: 400 TABLET, FILM COATED ORAL at 08:05

## 2025-05-24 RX ADMIN — OXYCODONE HYDROCHLORIDE 10 MG: 10 TABLET ORAL at 05:05

## 2025-05-24 RX ADMIN — SIMETHICONE 80 MG: 80 TABLET, CHEWABLE ORAL at 05:05

## 2025-05-24 RX ADMIN — PRENATAL VIT W/ FE FUMARATE-FA TAB 27-0.8 MG 1 TABLET: 27-0.8 TAB at 08:05

## 2025-05-29 ENCOUNTER — POSTPARTUM VISIT (OUTPATIENT)
Dept: OBSTETRICS AND GYNECOLOGY | Facility: CLINIC | Age: 30
End: 2025-05-29
Payer: OTHER GOVERNMENT

## 2025-05-29 VITALS
BODY MASS INDEX: 34.12 KG/M2 | WEIGHT: 217.38 LBS | HEIGHT: 67 IN | DIASTOLIC BLOOD PRESSURE: 90 MMHG | SYSTOLIC BLOOD PRESSURE: 120 MMHG

## 2025-05-29 DIAGNOSIS — K64.8 OTHER HEMORRHOIDS: ICD-10-CM

## 2025-05-29 DIAGNOSIS — O13.3 GESTATIONAL HYPERTENSION, THIRD TRIMESTER: ICD-10-CM

## 2025-05-29 DIAGNOSIS — Z98.891 STATUS POST PRIMARY LOW TRANSVERSE CESAREAN SECTION: Primary | ICD-10-CM

## 2025-05-29 PROCEDURE — 99213 OFFICE O/P EST LOW 20 MIN: CPT | Mod: PBBFAC | Performed by: OBSTETRICS & GYNECOLOGY

## 2025-05-29 PROCEDURE — 99999 PR PBB SHADOW E&M-EST. PATIENT-LVL III: CPT | Mod: PBBFAC,,, | Performed by: OBSTETRICS & GYNECOLOGY

## 2025-05-29 NOTE — PROGRESS NOTES
"Subjective     Patient ID: Penelope Velez is a 30 y.o. female.    Chief Complaint:  Blood Pressure Check      History of Present Illness  HPI  30 y.o.  presents for BP s/p 1LTCD on 25, complicated by gHTN, not requiring meds.  Doing well today, except continues with abdominal pain.  Using lidocaine patches and taking gabapentin.  Only taking oxy once  a day bc it makes her too loopy.  Breastfeeding going well.  No bowel or urinary complaints, except continued hemorrhoid pain, using proctofoam, and tucks pads.  Bonding well with baby, no s/sx of PP depression.  Normal lochia.  No other complaints today.     GYN & OB History  No LMP recorded. Patient is pregnant.   Date of Last Pap: 2024    OB History    Para Term  AB Living   1        SAB IAB Ectopic Multiple Live Births             # Outcome Date GA Lbr Eb/2nd Weight Sex Type Anes PTL Lv   1 Current                Review of Systems  Review of Systems   Constitutional:  Negative for chills and fever.   Respiratory:  Negative for shortness of breath.    Cardiovascular:  Negative for chest pain.   Gastrointestinal:  Positive for abdominal pain. Negative for constipation and diarrhea.   Genitourinary:  Positive for pelvic pain.   Neurological:  Negative for headaches.          Objective   Physical Exam    BP (!) 120/90   Ht 5' 7" (1.702 m)   Wt 98.6 kg (217 lb 6 oz)   Breastfeeding Yes   BMI 34.05 kg/m²     Gen: NAD  Resp: Normal respiratory effort  Abd: Soft, NT/ND.  Bandage removed.  Pfan incision healing well.  No erythema, exudate, or breakdown.    Pelvic: deferred  Ext: normal ROM  Psych: appropriate affect  Neuro: grossly intact         Assessment and Plan     Penelope was seen today for blood pressure check.    Diagnoses and all orders for this visit:    Status post primary low transverse  section    Gestational hypertension, third trimester    Other hemorrhoids          Plan:  Doing well from postpartum " standpoint.  Mild range BP, asymptomatic.  Can continue to monitor.    Incisional healing well today, reassurance given.  Exam benign.   No s/sx of PP depression.   Contraception counseling done -  getting vasectomy.   Breastfeeding going well.   Continue symptomatic treatment of hemorrhoids.   Counseling done, precautions given, all questions answered.  RTC  1 wk for BP, incision, and pain check.

## 2025-05-30 ENCOUNTER — TELEPHONE (OUTPATIENT)
Dept: OBSTETRICS AND GYNECOLOGY | Facility: CLINIC | Age: 30
End: 2025-05-30
Payer: OTHER GOVERNMENT

## 2025-05-30 NOTE — TELEPHONE ENCOUNTER
Called patient to ask her to send a picture of incision  thru portal for  to see and if not able to just go to [facility] to be seen ..

## 2025-06-02 ENCOUNTER — TELEPHONE (OUTPATIENT)
Dept: OBSTETRICS AND GYNECOLOGY | Facility: CLINIC | Age: 30
End: 2025-06-02
Payer: OTHER GOVERNMENT

## 2025-06-05 ENCOUNTER — POSTPARTUM VISIT (OUTPATIENT)
Dept: OBSTETRICS AND GYNECOLOGY | Facility: CLINIC | Age: 30
End: 2025-06-05
Payer: OTHER GOVERNMENT

## 2025-06-05 VITALS
DIASTOLIC BLOOD PRESSURE: 77 MMHG | SYSTOLIC BLOOD PRESSURE: 113 MMHG | HEIGHT: 67 IN | WEIGHT: 205.94 LBS | BODY MASS INDEX: 32.32 KG/M2

## 2025-06-05 DIAGNOSIS — O13.3 GESTATIONAL HYPERTENSION, THIRD TRIMESTER: ICD-10-CM

## 2025-06-05 DIAGNOSIS — K64.8 OTHER HEMORRHOIDS: ICD-10-CM

## 2025-06-05 DIAGNOSIS — Z98.891 STATUS POST PRIMARY LOW TRANSVERSE CESAREAN SECTION: Primary | ICD-10-CM

## 2025-06-05 PROCEDURE — 99999 PR PBB SHADOW E&M-EST. PATIENT-LVL III: CPT | Mod: PBBFAC,,, | Performed by: OBSTETRICS & GYNECOLOGY

## 2025-06-05 PROCEDURE — 99213 OFFICE O/P EST LOW 20 MIN: CPT | Mod: PBBFAC | Performed by: OBSTETRICS & GYNECOLOGY

## 2025-06-05 RX ORDER — OXYCODONE HYDROCHLORIDE 5 MG/1
5 TABLET ORAL EVERY 6 HOURS PRN
Qty: 10 TABLET | Refills: 0 | Status: SHIPPED | OUTPATIENT
Start: 2025-06-05

## 2025-06-16 ENCOUNTER — PATIENT MESSAGE (OUTPATIENT)
Dept: RESEARCH | Facility: OTHER | Age: 30
End: 2025-06-16
Payer: OTHER GOVERNMENT

## 2025-07-14 ENCOUNTER — POSTPARTUM VISIT (OUTPATIENT)
Dept: OBSTETRICS AND GYNECOLOGY | Facility: CLINIC | Age: 30
End: 2025-07-14
Payer: OTHER GOVERNMENT

## 2025-07-14 VITALS
SYSTOLIC BLOOD PRESSURE: 102 MMHG | WEIGHT: 198.44 LBS | HEIGHT: 67 IN | BODY MASS INDEX: 31.15 KG/M2 | DIASTOLIC BLOOD PRESSURE: 60 MMHG

## 2025-07-14 DIAGNOSIS — O13.3 GESTATIONAL HYPERTENSION, THIRD TRIMESTER: ICD-10-CM

## 2025-07-14 DIAGNOSIS — Z98.891 STATUS POST PRIMARY LOW TRANSVERSE CESAREAN SECTION: Primary | ICD-10-CM

## 2025-07-14 DIAGNOSIS — N89.8 VAGINAL DISCHARGE: ICD-10-CM

## 2025-07-14 PROCEDURE — 99213 OFFICE O/P EST LOW 20 MIN: CPT | Mod: PBBFAC | Performed by: OBSTETRICS & GYNECOLOGY

## 2025-07-14 PROCEDURE — 0503F POSTPARTUM CARE VISIT: CPT | Mod: ,,, | Performed by: OBSTETRICS & GYNECOLOGY

## 2025-07-14 PROCEDURE — 99999 PR PBB SHADOW E&M-EST. PATIENT-LVL III: CPT | Mod: PBBFAC,,, | Performed by: OBSTETRICS & GYNECOLOGY

## 2025-07-14 RX ORDER — PRENATAL WITH FERROUS FUM AND FOLIC ACID 3080; 920; 120; 400; 22; 1.84; 3; 20; 10; 1; 12; 200; 27; 25; 2 [IU]/1; [IU]/1; MG/1; [IU]/1; MG/1; MG/1; MG/1; MG/1; MG/1; MG/1; UG/1; MG/1; MG/1; MG/1; MG/1
1 TABLET ORAL DAILY
Qty: 30 TABLET | Refills: 11 | Status: SHIPPED | OUTPATIENT
Start: 2025-07-14 | End: 2026-07-14

## 2025-07-14 NOTE — PROGRESS NOTES
"Subjective     Patient ID: Penelope Velez is a 30 y.o. female.    Chief Complaint:  Postpartum Care (6 weeks)      History of Present Illness  HPI  30 y.o.  presents s/p 1LTCD on 25, complicated by gHNT but did not require antihypertensives.  Doing well today, no complaints.  Occ still pelvic pain, but much more manageable.  No longer requiring pain meds.  Hemorrhoids resolved.  She has noticed dry skin on her face, using a moisturizer.  Breastfeeding going well, occ engorgement.  No bowel or urinary complaints.  Bonding well with baby, no s/sx of PP depression.  No cycle since delivery.  Occ discharge.  No other complaints today.     GYN & OB History  No LMP recorded.   Date of Last Pap: 2024    OB History    Para Term  AB Living   1 1 1   1   SAB IAB Ectopic Multiple Live Births      0 1      # Outcome Date GA Lbr Eb/2nd Weight Sex Type Anes PTL Lv   1 Term 25 37w6d  3.27 kg (7 lb 3.3 oz) M CS-LTranv EPI N KOFI      Complications: Failure to Progress in First Stage, Fetal Intolerance       Review of Systems  Review of Systems   Constitutional:  Negative for chills and fever.   Respiratory:  Negative for shortness of breath.    Cardiovascular:  Negative for chest pain.   Gastrointestinal:  Negative for abdominal pain, constipation and diarrhea.   Genitourinary:  Negative for pelvic pain and vaginal discharge.   Musculoskeletal:  Negative for myalgias.   Neurological:  Negative for headaches.          Objective   Physical Exam    /60 (BP Location: Right arm, Patient Position: Sitting)   Ht 5' 7" (1.702 m)   Wt 90 kg (198 lb 6.6 oz)   Breastfeeding Yes   BMI 31.08 kg/m²     Gen: NAD  Resp: Normal respiratory effort  Abd: soft, NT/ND, pfan incision healing well.   Pelvic: Normal-appearing external female genitalia.  Cervix closed. Uterus small, mobile, nontender.  No adnexal masses or tenderness.    Ext: normal ROM  Psych: appropriate affect  Neuro: grossly " intact         Assessment and Plan     Penelope was seen today for postpartum care.    Diagnoses and all orders for this visit:    Status post primary low transverse  section    Gestational hypertension, third trimester    Vaginal discharge  -     Vaginosis Screen by DNA Probe    Other orders  -     PNV,calcium 72/iron/folic acid (PRENATAL VITAMIN) Tab; Take 1 tablet by mouth once daily.          Plan:  Doing well from postpartum standpoint.  Cleared to resume normal activities.  BP normotensive today.   No s/sx of PP depression.   Contraception counseling done -  getting vasectomy; will use condoms until then.   Breastfeeding going well.  Considering stopping around 6 month rasheed.  Discussed engorgement and treatment options.  Will slowly space out pumpings.   Continue facial moisturizer, refill PNV.   Vaginal cx pending, although exam benign today.   Counseling done, precautions given, all questions answered.  RTC Dec for annual or prn.

## 2025-08-15 ENCOUNTER — PATIENT MESSAGE (OUTPATIENT)
Dept: OBSTETRICS AND GYNECOLOGY | Facility: CLINIC | Age: 30
End: 2025-08-15
Payer: OTHER GOVERNMENT

## (undated) DEVICE — SYS KIWI OMNICUP VACUUM DEL